# Patient Record
Sex: FEMALE | URBAN - METROPOLITAN AREA
[De-identification: names, ages, dates, MRNs, and addresses within clinical notes are randomized per-mention and may not be internally consistent; named-entity substitution may affect disease eponyms.]

---

## 2022-10-28 ENCOUNTER — TRANSCRIBE ORDERS (OUTPATIENT)
Dept: ADMINISTRATIVE | Age: 37
End: 2022-10-28

## 2022-10-28 DIAGNOSIS — N63.10 MASSES OF BOTH BREASTS: Primary | ICD-10-CM

## 2022-10-28 DIAGNOSIS — N63.20 MASSES OF BOTH BREASTS: Primary | ICD-10-CM

## 2022-11-17 ENCOUNTER — HOSPITAL ENCOUNTER (OUTPATIENT)
Dept: ULTRASOUND IMAGING | Age: 37
Discharge: HOME | End: 2022-11-17
Payer: COMMERCIAL

## 2022-11-17 ENCOUNTER — HOSPITAL ENCOUNTER (OUTPATIENT)
Dept: MAMMOGRAPHY | Age: 37
Discharge: HOME | End: 2022-11-17
Payer: COMMERCIAL

## 2022-11-17 DIAGNOSIS — N63.20 MASSES OF BOTH BREASTS: ICD-10-CM

## 2022-11-17 DIAGNOSIS — N63.10 MASSES OF BOTH BREASTS: ICD-10-CM

## 2022-11-17 PROCEDURE — 76641 ULTRASOUND BREAST COMPLETE: CPT

## 2022-11-17 PROCEDURE — G0279 TOMOSYNTHESIS, MAMMO: HCPCS

## 2022-12-14 ENCOUNTER — OFFICE VISIT (OUTPATIENT)
Dept: SURGERY | Age: 37
End: 2022-12-14

## 2022-12-14 VITALS
HEART RATE: 89 BPM | DIASTOLIC BLOOD PRESSURE: 78 MMHG | OXYGEN SATURATION: 97 % | SYSTOLIC BLOOD PRESSURE: 146 MMHG | HEIGHT: 68 IN | WEIGHT: 122 LBS | BODY MASS INDEX: 18.49 KG/M2

## 2022-12-14 DIAGNOSIS — N63.10 MASS OF RIGHT BREAST, UNSPECIFIED QUADRANT: Primary | ICD-10-CM

## 2022-12-14 RX ORDER — CHOLECALCIFEROL (VITAMIN D3) 125 MCG
500 CAPSULE ORAL DAILY
COMMUNITY

## 2022-12-14 ASSESSMENT — ENCOUNTER SYMPTOMS
ABDOMINAL PAIN: 0
NAUSEA: 0
VOMITING: 0
SHORTNESS OF BREATH: 0
COLOR CHANGE: 0
WHEEZING: 0

## 2022-12-14 NOTE — PROGRESS NOTES
General Surgery Progress Note  MD Bernard Shepherd, BENNIE      PATIENT: Maria De Jesus Ridley, 1985, 44 y.o., female  MRN: [id number]    Reason for Evaluation & Chief Complaint:     Chief Complaint   Patient presents with    Consultation     N/P Bilat Breast Mass seen on imaging       SUBJECTIVE:  HPI: Patient presents for evaluation of a breast abnormality found on routine physical exam.  Change was noted 2 months ago. Patient does not routinely do self breast exams. Breast cancer risk factors include nulliparous and delayed child bearing, maternal grandfather with prostate & maternal grandmother with uterine cancer. Patient denies hormonal therapy. Patient denies nipple discharge or retraction. Patient denies to previous breast biopsy. Patient denies a personal history of breast cancer. Mammogram/US: 11/18/22    1. Bilateral well-circumscribed hypoechoic masses in both breasts overall   having a similar sonographic appearance. Findings favor a benign etiology   such as fibroadenomas or complicated cysts. Recommend a short-term    follow-up   ultrasound in 6 months. Dominant lesion in the right breast is amenable    to   percutaneous biopsy or aspiration if clinically indicated or if the    patient   wishes. BIRADS:   BIRADS - CATEGORY 3       I have reviewed the patient's(pertinent information to this visit) medical history, family history(scanned in  the Media tab under \"patient questioner\"), social history and review of systems with the patienttoday in the office. Past Surgical History:   Procedure Laterality Date    WISDOM TOOTH EXTRACTION Bilateral      History reviewed. No pertinent past medical history.   Family History   Problem Relation Age of Onset    High Blood Pressure Father     Heart Disease Maternal Grandfather     Cancer Maternal Grandfather         Prostate    High Blood Pressure Maternal Grandfather     High Blood Pressure Paternal Grandmother     High Blood Pressure Paternal Grandfather     Cancer Maternal Great Grandmother         Uterine    Breast Cancer Neg Hx        Review of Systems:         Review of Systems   Constitutional:  Negative for chills and fever. Respiratory:  Negative for shortness of breath and wheezing. Cardiovascular:  Negative for chest pain. Gastrointestinal:  Negative for abdominal pain, nausea and vomiting. Skin:  Negative for color change. Neurological:  Negative for syncope. OBJECTIVE:  Physical Exam:    BP (!) 146/78 (Site: Right Upper Arm, Position: Sitting, Cuff Size: Medium Adult)   Pulse 89   Ht 5' 7.5\" (1.715 m)   Wt 122 lb (55.3 kg)   SpO2 97%   BMI 18.83 kg/m²      Physical Exam  Constitutional:       Appearance: She is well-developed. Eyes:      General: No scleral icterus. Conjunctiva/sclera: Conjunctivae normal.   Cardiovascular:      Rate and Rhythm: Normal rate and regular rhythm. Heart sounds: Normal heart sounds. No murmur heard. Pulmonary:      Effort: Pulmonary effort is normal. No respiratory distress. Breath sounds: Normal breath sounds. No wheezing. Chest:   Breasts:     Right: Mass present. No inverted nipple, nipple discharge or tenderness. Left: Mass present. No inverted nipple, nipple discharge or tenderness. ASSESSMENT:  1. Mass of right breast, unspecified quadrant      PLAN:  Neri Iraheta is a 44 y.o. woman who presents today for a consultation regarding her bilateral abnormal mammogram.  I reviewed the clinical and imaging findings with her today. I explained to Ms. Fan Shagufta that imaging is helpful in identifying abnormal growths in the breast, but that histological evaluation of the tissue is the only way to know if a finding is cancerous or not. Possible pathologic etiologies include fibrocystic change, papilloma, atypia and carcinoma. Based on the core biopsy results, she may or may not require additional intervention.  I explained the rationale for an ultrasound-guided core needle biopsy     I explained that the pathology results should be available within a few business days of the procedure. If this confirms a benign finding, I would recommend that she resume her yearly screening mammograms. If there are any atypical or malignant features, we will discuss further recommendations in detail. At this time, she has no further questions and agrees to move forward with scheduling the biopsy. I encouraged her to call with any additional questions or concerns that may arise in the meantime. Follow Up:  Return for Follow up after testing.       Kimani Cordova, YVON - CNP

## 2023-01-05 ENCOUNTER — HOSPITAL ENCOUNTER (OUTPATIENT)
Dept: MAMMOGRAPHY | Age: 38
Discharge: HOME | End: 2023-01-05
Payer: COMMERCIAL

## 2023-01-05 ENCOUNTER — HOSPITAL ENCOUNTER (OUTPATIENT)
Dept: ULTRASOUND IMAGING | Age: 38
Discharge: HOME | End: 2023-01-05
Payer: COMMERCIAL

## 2023-01-05 DIAGNOSIS — N63.41 SUBAREOLAR MASS OF RIGHT BREAST: ICD-10-CM

## 2023-01-05 DIAGNOSIS — N63.10 MASS OF RIGHT BREAST, UNSPECIFIED QUADRANT: ICD-10-CM

## 2023-01-05 PROCEDURE — 2709999900 US BREAST BIOPSY W LOC DEVICE 1ST LESION RIGHT

## 2023-01-05 PROCEDURE — 88342 IMHCHEM/IMCYTCHM 1ST ANTB: CPT

## 2023-01-05 PROCEDURE — 88305 TISSUE EXAM BY PATHOLOGIST: CPT

## 2023-01-05 PROCEDURE — 77065 DX MAMMO INCL CAD UNI: CPT

## 2023-01-05 PROCEDURE — 88341 IMHCHEM/IMCYTCHM EA ADD ANTB: CPT

## 2023-01-09 ENCOUNTER — INITIAL CONSULT (OUTPATIENT)
Dept: ONCOLOGY | Age: 38
End: 2023-01-09
Payer: COMMERCIAL

## 2023-01-09 ENCOUNTER — HOSPITAL ENCOUNTER (OUTPATIENT)
Dept: INFUSION THERAPY | Age: 38
Discharge: HOME | End: 2023-01-09
Payer: COMMERCIAL

## 2023-01-09 VITALS
WEIGHT: 120.8 LBS | TEMPERATURE: 98 F | DIASTOLIC BLOOD PRESSURE: 106 MMHG | BODY MASS INDEX: 18.31 KG/M2 | SYSTOLIC BLOOD PRESSURE: 173 MMHG | OXYGEN SATURATION: 99 % | HEIGHT: 68 IN | HEART RATE: 60 BPM

## 2023-01-09 DIAGNOSIS — D75.1 ERYTHROCYTOSIS: ICD-10-CM

## 2023-01-09 LAB
ALBUMIN SERPL-MCNC: 4.6 GM/DL (ref 3.4–5)
ALP BLD-CCNC: 52 IU/L (ref 40–129)
ALT SERPL-CCNC: 10 U/L (ref 10–40)
ANION GAP SERPL CALCULATED.3IONS-SCNC: 13 MMOL/L (ref 4–16)
AST SERPL-CCNC: 13 IU/L (ref 15–37)
BASOPHILS ABSOLUTE: 0 K/CU MM
BASOPHILS RELATIVE PERCENT: 0.4 % (ref 0–1)
BILIRUB SERPL-MCNC: 1.6 MG/DL (ref 0–1)
BUN BLDV-MCNC: 13 MG/DL (ref 6–23)
CALCIUM SERPL-MCNC: 9.4 MG/DL (ref 8.3–10.6)
CHLORIDE BLD-SCNC: 105 MMOL/L (ref 99–110)
CO2: 21 MMOL/L (ref 21–32)
CREAT SERPL-MCNC: 0.6 MG/DL (ref 0.6–1.1)
DIFFERENTIAL TYPE: ABNORMAL
EOSINOPHILS ABSOLUTE: 0 K/CU MM
EOSINOPHILS RELATIVE PERCENT: 0.5 % (ref 0–3)
ERYTHROCYTE SEDIMENTATION RATE: <1 MM/HR (ref 0–20)
GFR SERPL CREATININE-BSD FRML MDRD: >60 ML/MIN/1.73M2
GLUCOSE BLD-MCNC: 93 MG/DL (ref 70–99)
HCT VFR BLD CALC: 45.4 % (ref 37–47)
HEMOGLOBIN: 15.1 GM/DL (ref 12.5–16)
LACTATE DEHYDROGENASE: 140 IU/L (ref 120–246)
LYMPHOCYTES ABSOLUTE: 1.6 K/CU MM
LYMPHOCYTES RELATIVE PERCENT: 19 % (ref 24–44)
MCH RBC QN AUTO: 29.5 PG (ref 27–31)
MCHC RBC AUTO-ENTMCNC: 33.3 % (ref 32–36)
MCV RBC AUTO: 88.7 FL (ref 78–100)
MONOCYTES ABSOLUTE: 0.4 K/CU MM
MONOCYTES RELATIVE PERCENT: 5.2 % (ref 0–4)
PDW BLD-RTO: 13.2 % (ref 11.7–14.9)
PLATELET # BLD: 218 K/CU MM (ref 140–440)
PMV BLD AUTO: 10.5 FL (ref 7.5–11.1)
POTASSIUM SERPL-SCNC: 4.3 MMOL/L (ref 3.5–5.1)
RBC # BLD: 5.12 M/CU MM (ref 4.2–5.4)
SEGMENTED NEUTROPHILS ABSOLUTE COUNT: 6.3 K/CU MM
SEGMENTED NEUTROPHILS RELATIVE PERCENT: 74.9 % (ref 36–66)
SODIUM BLD-SCNC: 139 MMOL/L (ref 135–145)
TOTAL PROTEIN: 7.1 GM/DL (ref 6.4–8.2)
WBC # BLD: 8.4 K/CU MM (ref 4–10.5)

## 2023-01-09 PROCEDURE — 99211 OFF/OP EST MAY X REQ PHY/QHP: CPT

## 2023-01-09 PROCEDURE — 85652 RBC SED RATE AUTOMATED: CPT

## 2023-01-09 PROCEDURE — 85025 COMPLETE CBC W/AUTO DIFF WBC: CPT

## 2023-01-09 PROCEDURE — 1036F TOBACCO NON-USER: CPT | Performed by: INTERNAL MEDICINE

## 2023-01-09 PROCEDURE — 82668 ASSAY OF ERYTHROPOIETIN: CPT

## 2023-01-09 PROCEDURE — G8427 DOCREV CUR MEDS BY ELIG CLIN: HCPCS | Performed by: INTERNAL MEDICINE

## 2023-01-09 PROCEDURE — 99204 OFFICE O/P NEW MOD 45 MIN: CPT | Performed by: INTERNAL MEDICINE

## 2023-01-09 PROCEDURE — 36415 COLL VENOUS BLD VENIPUNCTURE: CPT

## 2023-01-09 PROCEDURE — G8420 CALC BMI NORM PARAMETERS: HCPCS | Performed by: INTERNAL MEDICINE

## 2023-01-09 PROCEDURE — 83615 LACTATE (LD) (LDH) ENZYME: CPT

## 2023-01-09 PROCEDURE — G8484 FLU IMMUNIZE NO ADMIN: HCPCS | Performed by: INTERNAL MEDICINE

## 2023-01-09 PROCEDURE — 80053 COMPREHEN METABOLIC PANEL: CPT

## 2023-01-09 ASSESSMENT — PATIENT HEALTH QUESTIONNAIRE - PHQ9
SUM OF ALL RESPONSES TO PHQ QUESTIONS 1-9: 0
2. FEELING DOWN, DEPRESSED OR HOPELESS: 0
SUM OF ALL RESPONSES TO PHQ QUESTIONS 1-9: 0
SUM OF ALL RESPONSES TO PHQ9 QUESTIONS 1 & 2: 0
1. LITTLE INTEREST OR PLEASURE IN DOING THINGS: 0

## 2023-01-09 NOTE — PROGRESS NOTES
Patient Name:  Ayad Asher  Patient :  1985  Patient MRN:  [id number]     Primary Oncologist: Andrzej Garcia MD  Referring Provider: ANGY Dickson     Date of Service: 2023      Reason for Consult: To evaluate the patient with erythrocytosis. Chief Complaint:    Chief Complaint   Patient presents with    New Patient     Patient Active Problem List:     Erythrocytosis    HPI:   Ayad Asher is a 76-year-old very pleasant female with medical history significant for migraine headache and seasonal allergy, referred to me on 2023 for evaluation of erythrocytosis. She was noted to have mild erythrocytosis (Hemoglobin 16.6, hematocrit 48.5, RBC 5.49) on 10/14/22 labs. Repeat blood tests on 10/20/22, 10/26/22 and 22 showed persistently elevated mild erythrocytosis. She doesn't have leukocytosis or thrombocytosis. Differential showed relative neutrophilia. She denies smoking, sleep apnea or high altitude living. One of her paternal aunt has similar issue and work ups for her erythrocytosis were normal according to patient. Since she is found to have persistent erythrocytosis, she was referred to me for further evaluation. She doesn't have any significant symptoms at today visit. Past Medical History:     Significant for  1. Migraine headache  2. Seasonal allergy                                                    Past Surgery History:    Significant for  1. Breast biopsy on 2023                                                                           Social History:   She denies smoking, alcohol drinking or illicit drug abuse. Family History:    Significant for hypertension and mini stroke in her father, prostate cancer and hypertension in her maternal grandfather. Her paternal grandfather has diabetes and dementia. One of her paternal aunt has erythrocytosis. No Known Allergies    Current Outpatient Medications on File Prior to Visit   Medication Sig Dispense Refill    Cholecalciferol (VITAMIN D3) 125 MCG (5000 UT) TABS Take by mouth      vitamin B-12 (CYANOCOBALAMIN) 500 MCG tablet Take 1,000 mcg by mouth daily       No current facility-administered medications on file prior to visit. Review of Systems:  Constitutional:  No weight loss, No fever, No chills, No night sweats. Energy level good. Eyes:  No diplopia, No transient or permanent loss of vision, No scotomata. ENT / Mouth:  No epistaxis, No dysphagia, No hoarseness, No oral ulcers, No gingival bleeding. No sore throat, No postnasal drip, No nasal drip, No mouth pain, No sinus pain, No tinnitus, Normal hearing. Cardiovascular:  No chest pain, No palpitations, No syncope, No upper extremity edema, No lower extremity edema, No calf discomfort. Respiratory:  No cough. No hemoptysis, No pleurisy, No wheezing, No dyspnea. Breast:  No breast mass, No pain, No nipple discharge, No change in size, No change in shape. Gastrointestinal:  No abdominal pain, No abdominal cramping, No nausea, No vomiting, No constipation, No diarrhea, No hematochezia, No melena, No jaundice, No dyspepsia, No dysphagia. Urinary:  No dysuria, No hematuria, No urinary incontinence. Gynecological:  No vaginal discharge, No suprapubic pain, No abnormal vaginal bleeding. Musculoskeletal:  No muscle pain, No swollen joints, No joint redness, No bone pain, No spine tenderness. Skin:  No rash, No nodules, No pruritus, No lesions. Neurologic:  No confusion, No seizures, No syncope, No tremor, No speech change, No headache, No hiccups, No abnormal gait, No sensory changes, No weakness. Psychiatric:  No depression, No anxiety, Concentration normal.  Endocrine:  No polyuria, No polydipsia, No hot flashes, No thyroid symptoms.   Hematologic:  No epistaxis, No gingival bleeding, No petechiae, No ecchymosis. Lymphatic:  No lymphadenopathy, No lymphedema. Allergy / Immunologic:  No eczema, No frequent mucous infections, No frequent respiratory infections, No recurrent urticarial, No frequent skin infections. Vital Signs: BP (!) 173/106 (Site: Right Upper Arm, Position: Sitting, Cuff Size: Medium Adult)   Pulse 60   Temp 98 °F (36.7 °C) (Infrared)   Ht 5' 7.5\" (1.715 m)   Wt 120 lb 12.8 oz (54.8 kg)   SpO2 99%   BMI 18.64 kg/m²      Physical Exam:  CONSTITUTIONAL: awake, alert, cooperative, no apparent distress   EYES: pupils equal, round and reactive to light, sclera clear, normal conjunctiva  ENT: Normocephalic, without obvious abnormality, atraumatic  NECK: supple, symmetrical, no jugular venous distension, no carotid bruits   HEMATOLOGIC/LYMPHATIC: no cervical, supraclavicular or axillary lymphadenopathy   LUNGS: VBS, no wheezes, no increased work of breathing, no rhonchi, clear to auscultation, no crackles,    CARDIOVASCULAR: regular rate and rhythm, normal S1 and S2, no murmur noted  ABDOMEN: normal bowel sounds x 4, soft, non-distended, non-tender, no masses palpated, no hepatosplenomegaly   MUSCULOSKELETAL: full range of motion noted, tone is normal  NEUROLOGIC: awake, alert, oriented to name, place and time. Motor skills grossly intact. SKIN: appears intact, normal skin color, normal texture, normal turgor, no jaundice.    EXTREMITIES: no LE edema, no leg swelling, no cyanosis, no clubbing,       Labs:  Hematology:  Lab Results   Component Value Date    WBC 8.4 01/09/2023    RBC 5.12 01/09/2023    HGB 15.1 01/09/2023    HCT 45.4 01/09/2023    MCV 88.7 01/09/2023    MCH 29.5 01/09/2023    MCHC 33.3 01/09/2023    RDW 13.2 01/09/2023     01/09/2023    MPV 10.5 01/09/2023    SEGSPCT 74.9 (H) 01/09/2023    EOSRELPCT 0.5 01/09/2023    BASOPCT 0.4 01/09/2023    LYMPHOPCT 19.0 (L) 01/09/2023    MONOPCT 5.2 (H) 01/09/2023    SEGSABS 6.3 01/09/2023    EOSABS 0.0 01/09/2023    BASOSABS 0.0 01/09/2023    LYMPHSABS 1.6 01/09/2023    MONOSABS 0.4 01/09/2023    DIFFTYPE AUTOMATED DIFFERENTIAL 01/09/2023     No results found for: ESR  Chemistry:  No results found for: NA, K, CL, CO2, BUN, CREATININE, GLUCOSE, CALCIUM, PROT, LABALBU, BILITOT, ALKPHOS, AST, ALT, LABGLOM, GFRAA, AGRATIO, GLOB, PHOS, MG, POCCA, POCGLU  No results found for: MMA, LDH, HOMOCYSTEINE  No components found for: LD  No results found for: TSHHS, T4FREE, FT3  Immunology:  No results found for: PROT, SPEP, ALBUMINELP, LABALPH, LABBETA, GAMGLOB  No results found for: Vernon Kaitlin, KLFLCR  No results found for: B2M  Coagulation Panel:  No results found for: PROTIME, INR, APTT, DDIMER  Anemia Panel:  No results found for: [id number], FOLATE  Tumor Markers:  No results found for: , CEA, , LABCA2, PSA     Observations:  PHQ-9 Total Score: 0 (1/9/2023  1:50 PM)    Assessment   Erythrocytosis    Plan:  Anne Castillo is a 34-year-old very pleasant female who was noted to have mild erythrocytosis (Hemoglobin 16.6, hematocrit 48.5, RBC 5.49) on 10/14/22 labs. Repeat blood tests on 10/20/22, 10/26/22 and 12/2/22 showed persistently elevated mild erythrocytosis. She doesn't have leukocytosis or thrombocytosis. Differential showed relative neutrophilia. She denies smoking, sleep apnea or high altitude living. One of her paternal aunt has similar issue and work ups for her erythrocytosis were normal according to patient. I recommend to send proper work ups today to rule out primary polycythemia. I will request erythropoietin level, ESR, LDH, BCR-ABL1, JAK2 V617F, JAK2 exon 12-13, MPL and CALR today. I will also request US abdomen to r/o erythropoietin producing tumor. If all the above tests are normal, I will recommend for close observation. If she is found to have primary polycythemia, I will recommend therapeutic phlebotomy to keep her hematocrit less than 45%. I answered all her questions and concerns for today. Thank you for allowing me to participate in the care of this very pleasant patient. Recent imaging and labs were reviewed and discussed with the patient.

## 2023-01-09 NOTE — PROGRESS NOTES
MA Rooming Questions  Patient: Gisella Freed  MRN: [id number]    Date: 1/9/2023     New patient       Did the patient have a depression screening completed today?  Yes    No data recorded     PHQ-9 Given to (if applicable):               PHQ-9 Score (if applicable):                     [] Positive     [x]  Negative              Does question #9 need addressed (if applicable)                     [] Yes    [x]  No               Grant Ogden MA

## 2023-01-11 ENCOUNTER — HOSPITAL ENCOUNTER (OUTPATIENT)
Dept: ULTRASOUND IMAGING | Age: 38
Discharge: HOME | End: 2023-01-11
Payer: COMMERCIAL

## 2023-01-11 DIAGNOSIS — D75.1 ERYTHROCYTOSIS: ICD-10-CM

## 2023-01-11 LAB — ERYTHROPOIETIN: 10 MU/ML (ref 4–27)

## 2023-01-11 PROCEDURE — 76705 ECHO EXAM OF ABDOMEN: CPT

## 2023-01-15 ASSESSMENT — ENCOUNTER SYMPTOMS
WHEEZING: 0
VOMITING: 0
ABDOMINAL PAIN: 0
SHORTNESS OF BREATH: 0
COLOR CHANGE: 0
NAUSEA: 0

## 2023-01-15 NOTE — PROGRESS NOTES
General Surgery Progress Note  MD Stoney Martínez, BENNIE      PATIENT: Kay Bermudez, 1985, 44 y.o., female  MRN: [id number]    Reason for Evaluation & Chief Complaint:     Chief Complaint   Patient presents with    Follow-up     Fu US Guided Needle BX results 1/5/2023         SUBJECTIVE:  HPI: Patient presents for evaluation of a breast abnormality found on routine physical exam.  Change was noted 2 months ago. Patient does not routinely do self breast exams. Breast cancer risk factors include nulliparous and delayed child bearing, maternal grandfather with prostate & maternal grandmother with uterine cancer. Patient denies hormonal therapy. Patient denies nipple discharge or retraction. Patient denies to previous breast biopsy. Patient denies a personal history of breast cancer. She presents for follow up after biopsy    Mammogram/US: 11/18/22    1. Bilateral well-circumscribed hypoechoic masses in both breasts overall   having a similar sonographic appearance. Findings favor a benign etiology   such as fibroadenomas or complicated cysts. Recommend a short-term    follow-up   ultrasound in 6 months. Dominant lesion in the right breast is amenable    to   percutaneous biopsy or aspiration if clinically indicated or if the    patient   wishes. BIRADS:   BIRADS - CATEGORY 3     Breast, right, retroareolar, ultrasound guided needle core   biopsy:   -  Benign breast tissue containing fibroadenomatoid change,   focal cyst formation, usual   ductal hyperplasia, and rare focal pseudoangiomatous stroma   hyperplasia (see stains report). -  Negative for malignancy. I have reviewed the patient's(pertinent information to this visit) medical history, family history(scanned in  the Media tab under \"patient questioner\"), social history and review of systems with the patienttoday in the office.           Past Surgical History:   Procedure Laterality Date    US BREAST NEEDLE BIOPSY RIGHT Right 1/5/2023    US BREAST NEEDLE BIOPSY RIGHT 1/5/2023 [facility] [place] ULTRASOUND    WISDOM TOOTH EXTRACTION Bilateral      History reviewed. No pertinent past medical history. Family History   Problem Relation Age of Onset    High Blood Pressure Father     Heart Disease Maternal Grandfather     Cancer Maternal Grandfather         Prostate    High Blood Pressure Maternal Grandfather     High Blood Pressure Paternal Grandmother     High Blood Pressure Paternal Grandfather     Cancer Maternal Great Grandmother         Uterine    Breast Cancer Neg Hx        Review of Systems:         Review of Systems   Constitutional:  Negative for chills and fever. Respiratory:  Negative for shortness of breath and wheezing. Cardiovascular:  Negative for chest pain. Gastrointestinal:  Negative for abdominal pain, nausea and vomiting. Skin:  Negative for color change. Neurological:  Negative for syncope. OBJECTIVE:  Physical Exam:    /68 (Site: Right Upper Arm, Position: Sitting, Cuff Size: Medium Adult)   Pulse (!) 105   Ht 5' 7.5\" (1.715 m)   Wt 120 lb (54.4 kg)   SpO2 99%   BMI 18.52 kg/m²      Physical Exam  Constitutional:       Appearance: She is well-developed. Eyes:      General: No scleral icterus. Conjunctiva/sclera: Conjunctivae normal.   Cardiovascular:      Rate and Rhythm: Normal rate and regular rhythm. Heart sounds: Normal heart sounds. No murmur heard. Pulmonary:      Effort: Pulmonary effort is normal. No respiratory distress. Breath sounds: Normal breath sounds. No wheezing. Chest:   Breasts:     Right: Mass present. No inverted nipple, nipple discharge or tenderness. Left: Mass present. No inverted nipple, nipple discharge or tenderness. ASSESSMENT:  1. Mass of right breast, unspecified quadrant        PLAN:  Padilla Sim is a 44 y.o. woman who presents today to discuss biopsy results; results benign. Would recommend repeat mammogram in 6 months.       Follow Up:  Return in about 6 months (around 7/16/2023).       Nate Schaefer, APRN - CNP

## 2023-01-16 ENCOUNTER — OFFICE VISIT (OUTPATIENT)
Dept: SURGERY | Age: 38
End: 2023-01-16
Payer: COMMERCIAL

## 2023-01-16 VITALS
HEIGHT: 68 IN | DIASTOLIC BLOOD PRESSURE: 68 MMHG | HEART RATE: 105 BPM | SYSTOLIC BLOOD PRESSURE: 122 MMHG | WEIGHT: 120 LBS | OXYGEN SATURATION: 99 % | BODY MASS INDEX: 18.19 KG/M2

## 2023-01-16 DIAGNOSIS — N63.10 MASS OF RIGHT BREAST, UNSPECIFIED QUADRANT: Primary | ICD-10-CM

## 2023-01-16 PROCEDURE — G8420 CALC BMI NORM PARAMETERS: HCPCS | Performed by: NURSE PRACTITIONER

## 2023-01-16 PROCEDURE — 99213 OFFICE O/P EST LOW 20 MIN: CPT | Performed by: NURSE PRACTITIONER

## 2023-01-16 PROCEDURE — G8427 DOCREV CUR MEDS BY ELIG CLIN: HCPCS | Performed by: NURSE PRACTITIONER

## 2023-01-16 PROCEDURE — G8484 FLU IMMUNIZE NO ADMIN: HCPCS | Performed by: NURSE PRACTITIONER

## 2023-01-16 PROCEDURE — 1036F TOBACCO NON-USER: CPT | Performed by: NURSE PRACTITIONER

## 2023-01-16 ASSESSMENT — PATIENT HEALTH QUESTIONNAIRE - PHQ9
1. LITTLE INTEREST OR PLEASURE IN DOING THINGS: 0
SUM OF ALL RESPONSES TO PHQ QUESTIONS 1-9: 0
2. FEELING DOWN, DEPRESSED OR HOPELESS: 0
SUM OF ALL RESPONSES TO PHQ QUESTIONS 1-9: 0
SUM OF ALL RESPONSES TO PHQ9 QUESTIONS 1 & 2: 0

## 2023-01-21 LAB
BCR/ABL T(9,22): NORMAL
CALR MUTATION: NORMAL
JAK2 EXONS 12-15 MUTATION: NORMAL
JAK2 GENE MUTATION QUAL: NORMAL
MPL 515 MUTATION: NORMAL

## 2023-01-21 NOTE — PROGRESS NOTES
Patient Name:  Pravin Skelton  Patient :  1985  Patient MRN:  [id number]     Primary Oncologist: Varghese Montalvo MD  Referring Provider: ANGY Medellin     Date of Service: 2023     Chief Complaint:    Chief Complaint   Patient presents with    Follow-up     Patient Active Problem List:     Erythrocytosis    HPI:   Pravin Skelton is a 65-year-old very pleasant female with medical history significant for migraine headache and seasonal allergy, referred to me on 2023 for evaluation of erythrocytosis. She was noted to have mild erythrocytosis (Hemoglobin 16.6, hematocrit 48.5, RBC 5.49) on 10/14/22 labs. Repeat blood tests on 10/20/22, 10/26/22 and 22 showed persistently elevated mild erythrocytosis. She doesn't have leukocytosis or thrombocytosis. Differential showed relative neutrophilia. She denies smoking, sleep apnea or high altitude living. One of her paternal aunt has similar issue and work ups for her erythrocytosis were normal according to patient. Since she is found to have persistent erythrocytosis, she was referred to me for further evaluation. Laboratory work ups done on 23 showed normal hemoglobin and hematocrit (15.1/45.4). Her erythropoietin level is also normal (10 mU/ml). She has mild elevation in serum bilirubin. The rests of the blood tests, including LDH, ESR, BCR-ABL1, JAK2 V617F, JAK2 exon 12-14, MPL and CALR were within normal range. US abdomen done on 23 showed echogenic lesions in the liver measuring up to 1.6 cm. Findings may represent hemangioma, MRI liver may be obtained for confirmation. On 2023, she presented to me for follow-up. She was initially referred to me for evaluation of mild erythrocytosis. On today's visit, I reviewed with her findings on labs done on 2023 and ultrasound abdomen done on 2023.     Since her hemoglobin/hematocrit is back to normal and other work-ups for primary erythrocytosis negative, I recommend for observation. At this moment, I do not think she has primary polycythemia vera. Since she is found to have indeterminate 1.6 cm liver lesion, I will request MRI of the liver to make sure that it is not malignant process. She was seen by gastroenterologist for elevated liver enzymes. She is going to have some laboratory work ups soon and I will follow up with the results. She doesn't have any significant symptoms at today visit. Past Medical History:     Significant for  1. Migraine headache  2. Seasonal allergy                                                    Past Surgery History:    Significant for  1. Breast biopsy on 1/5/2023                                                                           Social History:   She denies smoking, alcohol drinking or illicit drug abuse. Family History:    Significant for hypertension and mini stroke in her father, prostate cancer and hypertension in her maternal grandfather. Her paternal grandfather has diabetes and dementia. One of her paternal aunt has erythrocytosis. No Known Allergies    Review of Systems:  Constitutional:  No weight loss, No fever, No chills, No night sweats. Energy level is pretty good. Eyes:  No diplopia, No transient or permanent loss of vision, No scotomata. ENT / Mouth:  No epistaxis, No dysphagia, No hoarseness, No oral ulcers, No gingival bleeding. No sore throat, No postnasal drip, No nasal drip, No mouth pain, No sinus pain, No tinnitus, Normal hearing. Cardiovascular:  No chest pain, No palpitations, No syncope, No upper extremity edema, No lower extremity edema, No calf discomfort. Respiratory:  No cough. No hemoptysis, No pleurisy, No wheezing, No dyspnea.   Breast:  No breast mass, No pain, No nipple discharge, No change in size, No change in shape.  Gastrointestinal:  No abdominal pain, No abdominal cramping, No nausea, No vomiting, No constipation, No diarrhea, No hematochezia, No melena, No jaundice, No dyspepsia, No dysphagia.  Urinary:  No dysuria, No hematuria, No urinary incontinence.  Gynecological:  No vaginal discharge, No suprapubic pain, No abnormal vaginal bleeding.    Musculoskeletal:  No muscle pain, No swollen joints, No joint redness, No bone pain, No spine tenderness.  Skin:  No rash, No nodules, No pruritus, No lesions.  Neurologic:  No confusion, No seizures, No syncope, No tremor, No speech change, No headache, No hiccups, No abnormal gait, No sensory changes, No weakness.  Psychiatric:  No depression, No anxiety, Concentration normal.  Endocrine:  No polyuria, No polydipsia, No hot flashes, No thyroid symptoms.  Hematologic:  No epistaxis, No gingival bleeding, No petechiae, No ecchymosis.  Lymphatic:  No lymphadenopathy, No lymphedema.  Allergy / Immunologic:  No eczema, No frequent mucous infections, No frequent respiratory infections, No recurrent urticarial, No frequent skin infections.     Vital Signs: BP (!) 172/94 (Site: Right Upper Arm, Position: Sitting, Cuff Size: Medium Adult)   Pulse (!) 103   Temp 97.9 °F (36.6 °C) (Infrared)   Ht 5' 7.5\" (1.715 m)   Wt 120 lb (54.4 kg)   SpO2 98%   BMI 18.52 kg/m²      Physical Exam:  CONSTITUTIONAL: awake, alert, cooperative, no apparent distress   EYES: pupils equal, round and reactive to light, sclera clear, normal conjunctiva  ENT: Normocephalic, without obvious abnormality, atraumatic  NECK: supple, symmetrical, no jugular venous distension, no carotid bruits   HEMATOLOGIC/LYMPHATIC: no cervical, supraclavicular or axillary lymphadenopathy   LUNGS: VBS, no wheezes, no increased work of breathing, no rhonchi, clear to auscultation, no crackles,    CARDIOVASCULAR: regular rate and rhythm, normal S1 and S2, no murmur noted  ABDOMEN:  normal bowel sounds x 4, soft, non-distended, non-tender, no masses palpated, no hepatosplenomegaly   MUSCULOSKELETAL: full range of motion noted, tone is normal  NEUROLOGIC: awake, alert, oriented to name, place and time. Motor skills grossly intact. SKIN: appears intact, normal skin color, normal texture, normal turgor, no jaundice.    EXTREMITIES: no LE edema, no clubbing, no leg swelling, no cyanosis,       Labs:  Hematology:  Lab Results   Component Value Date    WBC 8.4 01/09/2023    RBC 5.12 01/09/2023    HGB 15.1 01/09/2023    HCT 45.4 01/09/2023    MCV 88.7 01/09/2023    MCH 29.5 01/09/2023    MCHC 33.3 01/09/2023    RDW 13.2 01/09/2023     01/09/2023    MPV 10.5 01/09/2023    SEGSPCT 74.9 (H) 01/09/2023    EOSRELPCT 0.5 01/09/2023    BASOPCT 0.4 01/09/2023    LYMPHOPCT 19.0 (L) 01/09/2023    MONOPCT 5.2 (H) 01/09/2023    SEGSABS 6.3 01/09/2023    EOSABS 0.0 01/09/2023    BASOSABS 0.0 01/09/2023    LYMPHSABS 1.6 01/09/2023    MONOSABS 0.4 01/09/2023    DIFFTYPE AUTOMATED DIFFERENTIAL 01/09/2023     Lab Results   Component Value Date    ESR <1 01/09/2023     Chemistry:  Lab Results   Component Value Date     01/09/2023    K 4.3 01/09/2023     01/09/2023    CO2 21 01/09/2023    BUN 13 01/09/2023    CREATININE 0.6 01/09/2023    GLUCOSE 93 01/09/2023    CALCIUM 9.4 01/09/2023    PROT 7.1 01/09/2023    LABALBU 4.6 01/09/2023    BILITOT 1.6 (H) 01/09/2023    ALKPHOS 52 01/09/2023    AST 13 (L) 01/09/2023    ALT 10 01/09/2023    LABGLOM >60 01/09/2023     Lab Results   Component Value Date     01/09/2023     No components found for: LD  No results found for: TSHHS, T4FREE, FT3  Immunology:  Lab Results   Component Value Date    PROT 7.1 01/09/2023     No results found for: Terbharatia Chela, KLFLCR  No results found for: B2M  Coagulation Panel:  No results found for: PROTIME, INR, APTT, DDIMER  Anemia Panel:  No results found for: [id number], FOLATE  Tumor Markers:  No results found for: , CEA, , LABCA2, PSA     Observations:  No data recorded    Assessment   Erythrocytosis    Plan:  Sheela Palmer is a 42-year-old very pleasant female who was noted to have mild erythrocytosis (Hemoglobin 16.6, hematocrit 48.5, RBC 5.49) on 10/14/22 labs. Repeat blood tests on 10/20/22, 10/26/22 and 12/2/22 showed persistently elevated mild erythrocytosis. She doesn't have leukocytosis or thrombocytosis. Differential showed relative neutrophilia. She denies smoking, sleep apnea or high altitude living. One of her paternal aunt has similar issue and work ups for her erythrocytosis were normal according to patient. Laboratory work ups done on 1/9/23 showed normal hemoglobin and hematocrit (15.1/45.4). Her erythropoietin level is also normal (10 mU/ml). She has mild elevation in serum bilirubin. The rests of the blood tests, including LDH, ESR, BCR-ABL1, JAK2 V617F, JAK2 exon 12-14, MPL and CALR were within normal range. US abdomen done on 1/11/23 showed echogenic lesions in the liver measuring up to 1.6 cm. Findings may represent hemangioma, MRI liver may be obtained for confirmation. On January 30, 2023, she presented to me for follow-up. She was initially referred to me for evaluation of mild erythrocytosis. On today's visit, I reviewed with her findings on labs done on 1/9/2023 and ultrasound abdomen done on 1/11/2023. Since her hemoglobin/hematocrit is back to normal and other work-ups for primary erythrocytosis negative, I recommend for observation. At this moment, I do not think she has primary polycythemia vera. Since she is found to have indeterminate 1.6 cm liver lesion, I will request MRI of the liver to make sure that it is not malignant process. She was seen by gastroenterologist for elevated liver enzymes. She is going to have some laboratory work ups soon and I will follow up with the results. I answered all her questions and concerns for today.  Thank you for allowing me to participate in the care of this very pleasant patient. Recent imaging and labs were reviewed and discussed with the patient.

## 2023-01-30 ENCOUNTER — OFFICE VISIT (OUTPATIENT)
Dept: ONCOLOGY | Age: 38
End: 2023-01-30
Payer: COMMERCIAL

## 2023-01-30 ENCOUNTER — HOSPITAL ENCOUNTER (OUTPATIENT)
Dept: INFUSION THERAPY | Age: 38
Discharge: HOME | End: 2023-01-30
Payer: COMMERCIAL

## 2023-01-30 VITALS
DIASTOLIC BLOOD PRESSURE: 94 MMHG | HEIGHT: 68 IN | HEART RATE: 103 BPM | TEMPERATURE: 97.9 F | WEIGHT: 120 LBS | OXYGEN SATURATION: 98 % | SYSTOLIC BLOOD PRESSURE: 172 MMHG | BODY MASS INDEX: 18.19 KG/M2

## 2023-01-30 DIAGNOSIS — K76.9 LIVER LESION: ICD-10-CM

## 2023-01-30 DIAGNOSIS — D75.1 ERYTHROCYTOSIS: Primary | ICD-10-CM

## 2023-01-30 PROCEDURE — 99213 OFFICE O/P EST LOW 20 MIN: CPT | Performed by: INTERNAL MEDICINE

## 2023-01-30 PROCEDURE — 99211 OFF/OP EST MAY X REQ PHY/QHP: CPT

## 2023-01-30 PROCEDURE — 1036F TOBACCO NON-USER: CPT | Performed by: INTERNAL MEDICINE

## 2023-01-30 PROCEDURE — G8420 CALC BMI NORM PARAMETERS: HCPCS | Performed by: INTERNAL MEDICINE

## 2023-01-30 PROCEDURE — G8484 FLU IMMUNIZE NO ADMIN: HCPCS | Performed by: INTERNAL MEDICINE

## 2023-01-30 PROCEDURE — G8427 DOCREV CUR MEDS BY ELIG CLIN: HCPCS | Performed by: INTERNAL MEDICINE

## 2023-01-30 NOTE — PROGRESS NOTES
MA Rooming Questions  Patient: Mary Ann Oshea  MRN: [id number]    Date: 1/30/2023        1. Do you have any new issues?   no         2. Do you need any refills on medications?    no    3. Have you had any imaging done since your last visit? yes - u/s 1/11    4. Have you been hospitalized or seen in the emergency room since your last visit here?   no    5. Did the patient have a depression screening completed today?  No    No data recorded     PHQ-9 Given to (if applicable):               PHQ-9 Score (if applicable):                     [] Positive     []  Negative              Does question #9 need addressed (if applicable)                     [] Yes    []  No               Paige Raymond CMA

## 2023-01-30 NOTE — LETTER
[address] [place] [address]  [facility] [address]  Phone: [phone]  Fax: [phone]    Ciera Stone MD    January 31, 2023     Kai Lal, [address] [place], [place] [address]y [id number] [id number]    Patient: El Bermudez   MR Number: [id number]   YOB: 1985   Date of Visit: 1/30/2023       Dear Kai Lal: Thank you for referring El Bermudez to me for evaluation/treatment. Below are the relevant portions of my assessment and plan of care. If you have questions, please do not hesitate to call me. I look forward to following Payton Talbot along with you.     Sincerely,      Ciera Stone MD

## 2023-02-08 ENCOUNTER — HOSPITAL ENCOUNTER (OUTPATIENT)
Dept: MRI IMAGING | Age: 38
Discharge: HOME | End: 2023-02-08
Payer: COMMERCIAL

## 2023-02-08 DIAGNOSIS — D75.1 ERYTHROCYTOSIS: ICD-10-CM

## 2023-02-08 DIAGNOSIS — K76.9 LIVER LESION: ICD-10-CM

## 2023-02-08 PROCEDURE — 74183 MRI ABD W/O CNTR FLWD CNTR: CPT

## 2023-02-08 PROCEDURE — 6360000004 HC RX CONTRAST MEDICATION: Performed by: INTERNAL MEDICINE

## 2023-02-08 PROCEDURE — A9579 GAD-BASE MR CONTRAST NOS,1ML: HCPCS | Performed by: INTERNAL MEDICINE

## 2023-02-08 RX ADMIN — GADOTERIDOL 15 ML: 279.3 INJECTION, SOLUTION INTRAVENOUS at 11:49

## 2023-02-14 ENCOUNTER — TELEPHONE (OUTPATIENT)
Dept: ONCOLOGY | Age: 38
End: 2023-02-14

## 2023-02-19 NOTE — PROGRESS NOTES
Patient Name:  Kesha Jasmine  Patient :  1985  Patient MRN:  [id number]     Primary Oncologist: Lewis Mabry MD  Referring Provider: No primary care provider on file.     Date of Service: 2023     Chief Complaint:    Chief Complaint   Patient presents with    Follow-up     Patient Active Problem List:     Erythrocytosis    HPI:   Kesha Jasmine is a 37-year-old very pleasant female with medical history significant for migraine headache and seasonal allergy, referred to me on 2023 for evaluation of erythrocytosis.    She was noted to have mild erythrocytosis (Hemoglobin 16.6, hematocrit 48.5, RBC 5.49) on 10/14/22 labs. Repeat blood tests on 10/20/22, 10/26/22 and 22 showed persistently elevated mild erythrocytosis.     She doesn't have leukocytosis or thrombocytosis. Differential showed relative neutrophilia.     She denies smoking, sleep apnea or high altitude living. One of her paternal aunt has similar issue and work ups for her erythrocytosis were normal according to patient.     Since she is found to have persistent erythrocytosis, she was referred to me for further evaluation.     Laboratory work ups done on 23 showed normal hemoglobin and hematocrit (15.1/45.4). Her erythropoietin level is also normal (10 mU/ml). She has mild elevation in serum bilirubin. The rests of the blood tests, including LDH, ESR, BCR-ABL1, JAK2 V617F, JAK2 exon 12-14, MPL and CALR were within normal range.     US abdomen done on 23 showed echogenic lesions in the liver measuring up to 1.6 cm.  Findings may represent hemangioma, MRI liver may be obtained for confirmation.    MRI abdomen done on 23 showed suspected subtle hyperenhancing lesions in hepatic segment 8 and 5 could be hemangiomas or focal nodular hyperplasias and likely correspond with hyperechoic lesions seen sonographically. Radiologist recommend repeat imaging in 6 months utilizing eovist contrast. Possible iron deposition in the liver and  pancreas that can be seen with primary hemochromatosis. No findings of cirrhosis. On February 21, 2023, she presented to me for follow-up. She was initially referred to me for evaluation of mild erythrocytosis. Since her hemoglobin/hematocrit is back to normal and other work-ups for primary erythrocytosis on 1/9/23 were negative, I recommend for observation. At this moment, I do not think she has primary polycythemia vera. Since she is found to have indeterminate 1.6 cm liver lesion, I requested MRI. It was done on 2/8/23 and it is most likely hemangiomas. I will repeat MRI with eovist contrast in 6 months. For her possible iron deposition in liver and pancreas noted on MRI - will request iron study and hemochromatosis DNA panel. She doesn't have family history of hemochromatosis. She was seen by gastroenterologist for elevated liver enzymes. She is going to have some laboratory work ups soon and I will follow up with the results. She doesn't have any significant symptoms at today visit. Past Medical History:     Significant for  1. Migraine headache  2. Seasonal allergy                                                    Past Surgery History:    Significant for  1. Breast biopsy on 1/5/2023                                                                           Social History:   She denies smoking, alcohol drinking or illicit drug abuse. Family History:    Significant for hypertension and mini stroke in her father, prostate cancer and hypertension in her maternal grandfather. Her paternal grandfather has diabetes and dementia. One of her paternal aunt has erythrocytosis. No Known Allergies    Review of Systems: \"Per interval history; otherwise 10 point ROS is negative. \"   Her energy level is pretty good today and her sleep is fine. She denies fever, chills, night sweats, cough, shortness of breath, chest pain, hemoptysis, or palpitations. Her bowel and bladder functions are normal. She denies nausea, vomiting, abdominal pain, diarrhea, constipation, dysuria, loss of appetite, or weight loss. She denies neuropathy and she doesn't have bleeding or clotting issues. She denies any pain in her body. Denies anxiety or depression.  The rest of the systems are unremarkable.      Vital Signs: BP (!) 166/84 (Site: Right Upper Arm, Position: Sitting, Cuff Size: Medium Adult)   Pulse (!) 121   Temp 98 °F (36.7 °C) (Infrared)   Ht 5' 7.5\" (1.715 m)   Wt 122 lb (55.3 kg)   SpO2 99%   BMI 18.83 kg/m²      Physical Exam:  CONSTITUTIONAL: awake, alert, cooperative, no apparent distress   EYES: pupils equal, round and reactive to light, sclera clear, normal conjunctiva  ENT: Normocephalic, without obvious abnormality, atraumatic  NECK: supple, symmetrical, no jugular venous distension, no carotid bruits   HEMATOLOGIC/LYMPHATIC: no cervical, supraclavicular or axillary lymphadenopathy   LUNGS: VBS, no wheezes, clear to auscultation, no crackles, no increased work of breathing, no rhonchi,    CARDIOVASCULAR: regular rate and rhythm, normal S1 and S2, no murmur noted  ABDOMEN: normal bowel sounds x 4, soft, non-distended, non-tender, no masses palpated, no hepatosplenomegaly   MUSCULOSKELETAL: full range of motion noted, tone is normal  NEUROLOGIC: awake, alert, oriented to name, place and time. Motor skills grossly intact.   SKIN: appears intact, normal skin color, normal texture, normal turgor, no jaundice.   EXTREMITIES: no clubbing, no leg swelling, no LE edema, no cyanosis,       Labs:  Hematology:  Lab Results   Component Value Date    WBC 8.4 01/09/2023    RBC 5.12 01/09/2023    HGB 15.1 01/09/2023    HCT 45.4 01/09/2023    MCV 88.7 01/09/2023    MCH 29.5 01/09/2023    MCHC 33.3 01/09/2023    RDW 13.2  01/09/2023     01/09/2023    MPV 10.5 01/09/2023    SEGSPCT 74.9 (H) 01/09/2023    EOSRELPCT 0.5 01/09/2023    BASOPCT 0.4 01/09/2023    LYMPHOPCT 19.0 (L) 01/09/2023    MONOPCT 5.2 (H) 01/09/2023    SEGSABS 6.3 01/09/2023    EOSABS 0.0 01/09/2023    BASOSABS 0.0 01/09/2023    LYMPHSABS 1.6 01/09/2023    MONOSABS 0.4 01/09/2023    DIFFTYPE AUTOMATED DIFFERENTIAL 01/09/2023     Lab Results   Component Value Date    ESR <1 01/09/2023     Chemistry:  Lab Results   Component Value Date     01/09/2023    K 4.3 01/09/2023     01/09/2023    CO2 21 01/09/2023    BUN 13 01/09/2023    CREATININE 0.6 01/09/2023    GLUCOSE 93 01/09/2023    CALCIUM 9.4 01/09/2023    PROT 7.1 01/09/2023    LABALBU 4.6 01/09/2023    BILITOT 1.6 (H) 01/09/2023    ALKPHOS 52 01/09/2023    AST 13 (L) 01/09/2023    ALT 10 01/09/2023    LABGLOM >60 01/09/2023     Lab Results   Component Value Date     01/09/2023     No components found for: LD  No results found for: TSHHS, T4FREE, FT3  Immunology:  Lab Results   Component Value Date    PROT 7.1 01/09/2023     No results found for: Lion Trammell, KLFLCR  No results found for: B2M  Coagulation Panel:  No results found for: PROTIME, INR, APTT, DDIMER  Anemia Panel:  No results found for: [id number], FOLATE  Tumor Markers:  No results found for: , CEA, , LABCA2, PSA     Observations:  No data recorded    Assessment   Erythrocytosis    Plan:  Gordo Ramos is a 34-year-old very pleasant female who was noted to have mild erythrocytosis (Hemoglobin 16.6, hematocrit 48.5, RBC 5.49) on 10/14/22 labs. Repeat blood tests on 10/20/22, 10/26/22 and 12/2/22 showed persistently elevated mild erythrocytosis. She doesn't have leukocytosis or thrombocytosis. Differential showed relative neutrophilia. She denies smoking, sleep apnea or high altitude living. One of her paternal aunt has similar issue and work ups for her erythrocytosis were normal according to patient. Laboratory work ups done on 1/9/23 showed normal hemoglobin and hematocrit (15.1/45.4). Her erythropoietin level is also normal (10 mU/ml). She has mild elevation in serum bilirubin. The rests of the blood tests, including LDH, ESR, BCR-ABL1, JAK2 V617F, JAK2 exon 12-14, MPL and CALR were within normal range. US abdomen done on 1/11/23 showed echogenic lesions in the liver measuring up to 1.6 cm. Findings may represent hemangioma, MRI liver may be obtained for confirmation. MRI abdomen done on 2/8/23 showed suspected subtle hyperenhancing lesions in hepatic segment 8 and 5 could be hemangiomas or focal nodular hyperplasias and likely correspond with hyperechoic lesions seen sonographically. Radiologist recommend repeat imaging in 6 months utilizing eovist contrast. Possible iron deposition in the liver and pancreas that can be seen with primary hemochromatosis. No findings of cirrhosis. On February 21, 2023, she presented to me for follow-up. She was initially referred to me for evaluation of mild erythrocytosis. Since her hemoglobin/hematocrit is back to normal and other work-ups for primary erythrocytosis on 1/9/23 were negative, I recommend for observation. At this moment, I do not think she has primary polycythemia vera. Since she is found to have indeterminate 1.6 cm liver lesion, I requested MRI. It was done on 2/8/23 and it is most likely hemangiomas. I will repeat MRI with eovist contrast in 6 months. For her possible iron deposition in liver and pancreas noted on MRI - will request iron study and hemochromatosis DNA panel. She doesn't have family history of hemochromatosis. She was seen by gastroenterologist for elevated liver enzymes. She is going to have some laboratory work ups soon and I will follow up with the results. I answered all her questions and concerns for today. Recent imaging and labs were reviewed and discussed with the patient.

## 2023-02-21 ENCOUNTER — HOSPITAL ENCOUNTER (OUTPATIENT)
Dept: INFUSION THERAPY | Age: 38
Discharge: HOME | End: 2023-02-21
Payer: COMMERCIAL

## 2023-02-21 ENCOUNTER — OFFICE VISIT (OUTPATIENT)
Dept: ONCOLOGY | Age: 38
End: 2023-02-21
Payer: COMMERCIAL

## 2023-02-21 VITALS
HEART RATE: 121 BPM | TEMPERATURE: 98 F | DIASTOLIC BLOOD PRESSURE: 84 MMHG | BODY MASS INDEX: 18.49 KG/M2 | OXYGEN SATURATION: 99 % | SYSTOLIC BLOOD PRESSURE: 166 MMHG | WEIGHT: 122 LBS | HEIGHT: 68 IN

## 2023-02-21 DIAGNOSIS — R93.5 ABNORMAL MRI OF ABDOMEN: ICD-10-CM

## 2023-02-21 DIAGNOSIS — D75.1 ERYTHROCYTOSIS: ICD-10-CM

## 2023-02-21 DIAGNOSIS — D75.1 ERYTHROCYTOSIS: Primary | ICD-10-CM

## 2023-02-21 LAB
FERRITIN: 25 NG/ML (ref 15–150)
IRON: 40 UG/DL (ref 37–145)
PCT TRANSFERRIN: 13 % (ref 10–44)
TOTAL IRON BINDING CAPACITY: 316 UG/DL (ref 250–450)
UNSATURATED IRON BINDING CAPACITY: 276 UG/DL (ref 110–370)

## 2023-02-21 PROCEDURE — 83540 ASSAY OF IRON: CPT

## 2023-02-21 PROCEDURE — 36415 COLL VENOUS BLD VENIPUNCTURE: CPT

## 2023-02-21 PROCEDURE — 1036F TOBACCO NON-USER: CPT | Performed by: INTERNAL MEDICINE

## 2023-02-21 PROCEDURE — G8484 FLU IMMUNIZE NO ADMIN: HCPCS | Performed by: INTERNAL MEDICINE

## 2023-02-21 PROCEDURE — 82728 ASSAY OF FERRITIN: CPT

## 2023-02-21 PROCEDURE — 99213 OFFICE O/P EST LOW 20 MIN: CPT | Performed by: INTERNAL MEDICINE

## 2023-02-21 PROCEDURE — 99211 OFF/OP EST MAY X REQ PHY/QHP: CPT

## 2023-02-21 PROCEDURE — G8420 CALC BMI NORM PARAMETERS: HCPCS | Performed by: INTERNAL MEDICINE

## 2023-02-21 PROCEDURE — 81256 HFE GENE: CPT

## 2023-02-21 PROCEDURE — G8427 DOCREV CUR MEDS BY ELIG CLIN: HCPCS | Performed by: INTERNAL MEDICINE

## 2023-02-21 PROCEDURE — 83550 IRON BINDING TEST: CPT

## 2023-02-21 NOTE — PROGRESS NOTES
MA Rooming Questions  Patient: Timoteo Hanna  MRN: [id number]    Date: 2/21/2023        1. Do you have any new issues?   no         2. Do you need any refills on medications?    no    3. Have you had any imaging done since your last visit? yes - MRI 2/8    4. Have you been hospitalized or seen in the emergency room since your last visit here?   no    5. Did the patient have a depression screening completed today?  No    No data recorded     PHQ-9 Given to (if applicable):               PHQ-9 Score (if applicable):                     [] Positive     []  Negative              Does question #9 need addressed (if applicable)                     [] Yes    []  No               Cherry Velasquez CMA

## 2023-03-01 LAB
HFE GENE MUT ANL BLD/T: NORMAL
HFE P.C282Y BLD/T QL: NEGATIVE
HFE P.H63D BLD/T QL: NORMAL
HFE P.S65C BLD/T QL: NEGATIVE
SPECIMEN SOURCE: NORMAL

## 2023-03-07 ENCOUNTER — OFFICE VISIT (OUTPATIENT)
Dept: ONCOLOGY | Age: 38
End: 2023-03-07
Payer: COMMERCIAL

## 2023-03-07 ENCOUNTER — HOSPITAL ENCOUNTER (OUTPATIENT)
Dept: INFUSION THERAPY | Age: 38
Discharge: HOME | End: 2023-03-07
Payer: COMMERCIAL

## 2023-03-07 VITALS
TEMPERATURE: 98.2 F | RESPIRATION RATE: 16 BRPM | DIASTOLIC BLOOD PRESSURE: 79 MMHG | WEIGHT: 122.2 LBS | OXYGEN SATURATION: 99 % | SYSTOLIC BLOOD PRESSURE: 155 MMHG | HEIGHT: 67 IN | BODY MASS INDEX: 19.18 KG/M2 | HEART RATE: 85 BPM

## 2023-03-07 DIAGNOSIS — K76.9 LIVER LESION: ICD-10-CM

## 2023-03-07 DIAGNOSIS — D75.1 ERYTHROCYTOSIS: Primary | ICD-10-CM

## 2023-03-07 PROCEDURE — 99211 OFF/OP EST MAY X REQ PHY/QHP: CPT

## 2023-03-07 PROCEDURE — G8420 CALC BMI NORM PARAMETERS: HCPCS | Performed by: INTERNAL MEDICINE

## 2023-03-07 PROCEDURE — G8427 DOCREV CUR MEDS BY ELIG CLIN: HCPCS | Performed by: INTERNAL MEDICINE

## 2023-03-07 PROCEDURE — 1036F TOBACCO NON-USER: CPT | Performed by: INTERNAL MEDICINE

## 2023-03-07 PROCEDURE — G8484 FLU IMMUNIZE NO ADMIN: HCPCS | Performed by: INTERNAL MEDICINE

## 2023-03-07 PROCEDURE — 99213 OFFICE O/P EST LOW 20 MIN: CPT | Performed by: INTERNAL MEDICINE

## 2023-03-07 NOTE — PROGRESS NOTES
MA Rooming Questions  Patient: Milla Rader  MRN: [id number]    Date: 3/7/2023        1. Do you have any new issues? 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 Do you need any refills on medications?    no    3. Have you had any imaging done since your last visit?   no    4. Have you been hospitalized or seen in the emergency room since your last visit here?   no    5. Did the patient have a depression screening completed today?  No    No data recorded     PHQ-9 Given to (if applicable):               PHQ-9 Score (if applicable):                     [] Positive     []  Negative              Does question #9 need addressed (if applicable)                     [] Yes    []  No               Darleen Velez CMA

## 2023-03-07 NOTE — PROGRESS NOTES
Patient Name:  Hermilo Jarrell  Patient :  1985  Patient MRN:  [id number]     Primary Oncologist: Benny Christopher MD  Referring Provider: No primary care provider on file. Date of Service: 3/7/2023     Chief Complaint:    Chief Complaint   Patient presents with    Follow-up       Patient Active Problem List:     Erythrocytosis    HPI:   Hermilo Jarrell is a 39-year-old very pleasant female with medical history significant for migraine headache and seasonal allergy, referred to me on 2023 for evaluation of erythrocytosis. She was noted to have mild erythrocytosis (Hemoglobin 16.6, hematocrit 48.5, RBC 5.49) on 10/14/22 labs. Repeat blood tests on 10/20/22, 10/26/22 and 22 showed persistently elevated mild erythrocytosis. She doesn't have leukocytosis or thrombocytosis. Differential showed relative neutrophilia. She denies smoking, sleep apnea or high altitude living. One of her paternal aunt has similar issue and work ups for her erythrocytosis were normal according to patient. Since she is found to have persistent erythrocytosis, she was referred to me for further evaluation. Laboratory work ups done on 23 showed normal hemoglobin and hematocrit (15.1/45.4). Her erythropoietin level is also normal (10 mU/ml). She has mild elevation in serum bilirubin. The rests of the blood tests, including LDH, ESR, BCR-ABL1, JAK2 V617F, JAK2 exon 12-14, MPL and CALR were within normal range. US abdomen done on 23 showed echogenic lesions in the liver measuring up to 1.6 cm. Findings may represent hemangioma, MRI liver may be obtained for confirmation. MRI abdomen done on 23 showed suspected subtle hyperenhancing lesions in hepatic segment 8 and 5 could be hemangiomas or focal nodular hyperplasias and likely correspond with hyperechoic lesions seen sonographically.  Radiologist recommend repeat imaging in 6 months utilizing eovist contrast. Possible iron deposition in the liver and pancreas that can be seen with primary hemochromatosis. No findings of cirrhosis. Iron study done on 2/21/23 showed ferritin 25 ng/ml, iron 40, TIBC 316, transferrin 13%. Hereditary hemochromatosis DNA panel showed heterozygous H63D mutation. On March 7, 2023, she presented to me for follow-up. She was initially referred to me for evaluation of mild erythrocytosis. Since her hemoglobin/hematocrit is back to normal and other work-ups for primary erythrocytosis on 1/9/23 were negative, I recommend for observation. At this moment, I do not think she has primary polycythemia vera. Since she is found to have indeterminate 1.6 cm liver lesion, I requested MRI. It was done on 2/8/23 and it is most likely hemangiomas. I will request MRI with eovist contrast in 6 months. For her possible iron deposition in liver and pancreas noted on MRI - however iron study was normal and hemochromatosis DNA panel only showed heterozygous H63D mutation. I don't think she has iron deposition disease. No additional work ups needed. She was seen by gastroenterologist for elevated bilirubin and she was diagnosed with Gilbert's syndrome. She doesn't have any significant symptoms at today visit. Past Medical History:     Significant for  1. Migraine headache  2. Seasonal allergy                                                    Past Surgery History:    Significant for  1. Breast biopsy on 1/5/2023                                                                           Social History:   She denies smoking, alcohol drinking or illicit drug abuse. Family History:    Significant for hypertension and mini stroke in her father, prostate cancer and hypertension in her maternal grandfather. Her paternal grandfather has diabetes and dementia. One of her paternal aunt has erythrocytosis. No Known Allergies    Review of Systems: \"Per interval history; otherwise 10 point ROS is negative. \"   Her energy level is good and her sleep is fine. She denies fever, chills, night sweats, cough, shortness of breath, chest pain, hemoptysis, or palpitations. Her bowel and bladder functions are normal. She denies nausea, vomiting, abdominal pain, diarrhea, constipation, dysuria, loss of appetite, or weight loss. She denies neuropathy and she doesn't have bleeding or clotting issues. She denies any pain in her body. No anxiety or depression. The rest of the systems are unremarkable. Vital Signs: BP (!) 155/79 (Site: Right Upper Arm, Position: Sitting, Cuff Size: Medium Adult)   Pulse 85   Temp 98.2 °F (36.8 °C) (Temporal)   Resp 16   Ht 5' 7\" (1.702 m)   Wt 122 lb 3.2 oz (55.4 kg)   SpO2 99%   BMI 19.14 kg/m²      Physical Exam:  CONSTITUTIONAL: awake, alert, cooperative, no apparent distress   EYES: pupils equal, round and reactive to light, sclera clear, normal conjunctiva  ENT: Normocephalic, without obvious abnormality, atraumatic  NECK: supple, symmetrical, no jugular venous distension, no carotid bruits   HEMATOLOGIC/LYMPHATIC: no cervical, supraclavicular or axillary lymphadenopathy   LUNGS: VBS, no wheezes, clear to auscultation, no crackles, no increased work of breathing, no rhonchi,    CARDIOVASCULAR: regular rate and rhythm, normal S1 and S2, no murmur noted  ABDOMEN: normal bowel sounds x 4, soft, non-distended, non-tender, no masses palpated, no hepatosplenomegaly   MUSCULOSKELETAL: full range of motion noted, tone is normal  NEUROLOGIC: awake, alert, oriented to name, place and time. Motor skills grossly intact. SKIN: appears intact, normal skin color, normal texture, normal turgor, no jaundice.    EXTREMITIES: no leg swelling, no LE edema, no clubbing, no cyanosis,       Labs:  Hematology:  Lab Results   Component Value Date    WBC 8.4 01/09/2023    RBC 5.12 01/09/2023    HGB 15.1 01/09/2023    HCT 45.4 01/09/2023    MCV 88.7 01/09/2023    MCH 29.5 01/09/2023    MCHC 33.3 01/09/2023    RDW 13.2 01/09/2023     01/09/2023    MPV 10.5 01/09/2023    SEGSPCT 74.9 (H) 01/09/2023    EOSRELPCT 0.5 01/09/2023    BASOPCT 0.4 01/09/2023    LYMPHOPCT 19.0 (L) 01/09/2023    MONOPCT 5.2 (H) 01/09/2023    SEGSABS 6.3 01/09/2023    EOSABS 0.0 01/09/2023    BASOSABS 0.0 01/09/2023    LYMPHSABS 1.6 01/09/2023    MONOSABS 0.4 01/09/2023    DIFFTYPE AUTOMATED DIFFERENTIAL 01/09/2023     Lab Results   Component Value Date    ESR <1 01/09/2023     Chemistry:  Lab Results   Component Value Date     01/09/2023    K 4.3 01/09/2023     01/09/2023    CO2 21 01/09/2023    BUN 13 01/09/2023    CREATININE 0.6 01/09/2023    GLUCOSE 93 01/09/2023    CALCIUM 9.4 01/09/2023    PROT 7.1 01/09/2023    LABALBU 4.6 01/09/2023    BILITOT 1.6 (H) 01/09/2023    ALKPHOS 52 01/09/2023    AST 13 (L) 01/09/2023    ALT 10 01/09/2023    LABGLOM >60 01/09/2023     Lab Results   Component Value Date     01/09/2023     No components found for: LD  No results found for: TSHHS, T4FREE, FT3  Immunology:  Lab Results   Component Value Date    PROT 7.1 01/09/2023     No results found for: Creed Lolis, KLFLCR  No results found for: B2M  Coagulation Panel:  No results found for: PROTIME, INR, APTT, DDIMER  Anemia Panel:  No results found for: BETRKGNC24, FOLATE  Tumor Markers:  No results found for: , CEA, , LABCA2, PSA     Observations:  No data recorded    Assessment   Erythrocytosis    Plan:  Dominga Fuentes is a 79-year-old very pleasant female who was noted to have mild erythrocytosis (Hemoglobin 16.6, hematocrit 48.5, RBC 5.49) on 10/14/22 labs. Repeat blood tests on 10/20/22, 10/26/22 and 12/2/22 showed persistently elevated mild erythrocytosis. She doesn't have leukocytosis or thrombocytosis.  Differential showed relative neutrophilia. She denies smoking, sleep apnea or high altitude living. One of her paternal aunt has similar issue and work ups for her erythrocytosis were normal according to patient. Laboratory work ups done on 1/9/23 showed normal hemoglobin and hematocrit (15.1/45.4). Her erythropoietin level is also normal (10 mU/ml). She has mild elevation in serum bilirubin. The rests of the blood tests, including LDH, ESR, BCR-ABL1, JAK2 V617F, JAK2 exon 12-14, MPL and CALR were within normal range. US abdomen done on 1/11/23 showed echogenic lesions in the liver measuring up to 1.6 cm. Findings may represent hemangioma, MRI liver may be obtained for confirmation. MRI abdomen done on 2/8/23 showed suspected subtle hyperenhancing lesions in hepatic segment 8 and 5 could be hemangiomas or focal nodular hyperplasias and likely correspond with hyperechoic lesions seen sonographically. Radiologist recommend repeat imaging in 6 months utilizing eovist contrast. Possible iron deposition in the liver and pancreas that can be seen with primary hemochromatosis. No findings of cirrhosis. Iron study done on 2/21/23 showed ferritin 25 ng/ml, iron 40, TIBC 316, transferrin 13%. Hereditary hemochromatosis DNA panel showed heterozygous H63D mutation. On March 7, 2023, she presented to me for follow-up. She was initially referred to me for evaluation of mild erythrocytosis. Since her hemoglobin/hematocrit is back to normal and other work-ups for primary erythrocytosis on 1/9/23 were negative, I recommend for observation. At this moment, I do not think she has primary polycythemia vera. Since she is found to have indeterminate 1.6 cm liver lesion, I requested MRI. It was done on 2/8/23 and it is most likely hemangiomas. I will request MRI with eovist contrast in 6 months.      For her possible iron deposition in liver and pancreas noted on MRI - however iron study was normal and hemochromatosis DNA panel only showed heterozygous H63D mutation. I don't think she has iron deposition disease. No additional work ups needed. She was seen by gastroenterologist for elevated bilirubin and she was diagnosed with Gilbert's syndrome. I answered all her questions and concerns for today. Recent imaging and labs were reviewed and discussed with the patient.

## 2023-05-04 ENCOUNTER — HOSPITAL ENCOUNTER (OUTPATIENT)
Dept: MAMMOGRAPHY | Age: 38
End: 2023-05-04
Payer: COMMERCIAL

## 2023-05-04 ENCOUNTER — HOSPITAL ENCOUNTER (OUTPATIENT)
Dept: ULTRASOUND IMAGING | Age: 38
Discharge: HOME | End: 2023-05-04
Payer: COMMERCIAL

## 2023-05-04 DIAGNOSIS — R92.8 ABNORMAL FINDING ON BREAST IMAGING: ICD-10-CM

## 2023-05-04 DIAGNOSIS — N63.10 MASS OF RIGHT BREAST, UNSPECIFIED QUADRANT: ICD-10-CM

## 2023-05-04 PROCEDURE — 76642 ULTRASOUND BREAST LIMITED: CPT

## 2023-05-15 ENCOUNTER — OFFICE VISIT (OUTPATIENT)
Dept: SURGERY | Age: 38
End: 2023-05-15
Payer: COMMERCIAL

## 2023-05-15 VITALS
HEART RATE: 89 BPM | HEIGHT: 67 IN | SYSTOLIC BLOOD PRESSURE: 140 MMHG | DIASTOLIC BLOOD PRESSURE: 92 MMHG | BODY MASS INDEX: 19.15 KG/M2 | OXYGEN SATURATION: 97 % | WEIGHT: 122 LBS

## 2023-05-15 DIAGNOSIS — N60.12 FIBROCYSTIC BREAST CHANGES OF BOTH BREASTS: ICD-10-CM

## 2023-05-15 DIAGNOSIS — N63.10 MASS OF RIGHT BREAST, UNSPECIFIED QUADRANT: Primary | ICD-10-CM

## 2023-05-15 DIAGNOSIS — N60.11 FIBROCYSTIC BREAST CHANGES OF BOTH BREASTS: ICD-10-CM

## 2023-05-15 PROCEDURE — G8427 DOCREV CUR MEDS BY ELIG CLIN: HCPCS | Performed by: SURGERY

## 2023-05-15 PROCEDURE — G8420 CALC BMI NORM PARAMETERS: HCPCS | Performed by: SURGERY

## 2023-05-15 PROCEDURE — 1036F TOBACCO NON-USER: CPT | Performed by: SURGERY

## 2023-05-15 PROCEDURE — 99213 OFFICE O/P EST LOW 20 MIN: CPT | Performed by: SURGERY

## 2023-05-15 ASSESSMENT — ENCOUNTER SYMPTOMS
SHORTNESS OF BREATH: 0
NAUSEA: 0
WHEEZING: 0
VOMITING: 0
ABDOMINAL PAIN: 0
COLOR CHANGE: 0

## 2023-05-15 ASSESSMENT — PATIENT HEALTH QUESTIONNAIRE - PHQ9
SUM OF ALL RESPONSES TO PHQ QUESTIONS 1-9: 0
1. LITTLE INTEREST OR PLEASURE IN DOING THINGS: 0
2. FEELING DOWN, DEPRESSED OR HOPELESS: 0
SUM OF ALL RESPONSES TO PHQ9 QUESTIONS 1 & 2: 0
SUM OF ALL RESPONSES TO PHQ QUESTIONS 1-9: 0

## 2023-08-25 ENCOUNTER — CLINICAL DOCUMENTATION (OUTPATIENT)
Dept: ONCOLOGY | Age: 38
End: 2023-08-25

## 2023-08-30 ENCOUNTER — TELEPHONE (OUTPATIENT)
Dept: ONCOLOGY | Age: 38
End: 2023-08-30

## 2023-09-01 ENCOUNTER — HOSPITAL ENCOUNTER (OUTPATIENT)
Age: 38
Discharge: HOME | End: 2023-09-01
Payer: COMMERCIAL

## 2023-09-01 LAB
ALBUMIN SERPL-MCNC: 4.3 GM/DL (ref 3.4–5)
ALP BLD-CCNC: 50 IU/L (ref 40–129)
ALT SERPL-CCNC: 11 U/L (ref 10–40)
ANION GAP SERPL CALCULATED.3IONS-SCNC: 10 MMOL/L (ref 4–16)
AST SERPL-CCNC: 14 IU/L (ref 15–37)
BASOPHILS ABSOLUTE: 0 K/CU MM
BASOPHILS RELATIVE PERCENT: 0.8 % (ref 0–1)
BILIRUB SERPL-MCNC: 1.8 MG/DL (ref 0–1)
BUN SERPL-MCNC: 9 MG/DL (ref 6–23)
CALCIUM SERPL-MCNC: 9.2 MG/DL (ref 8.3–10.6)
CHLORIDE BLD-SCNC: 105 MMOL/L (ref 99–110)
CO2: 25 MMOL/L (ref 21–32)
CREAT SERPL-MCNC: 0.7 MG/DL (ref 0.6–1.1)
DIFFERENTIAL TYPE: ABNORMAL
EOSINOPHILS ABSOLUTE: 0.2 K/CU MM
EOSINOPHILS RELATIVE PERCENT: 2.9 % (ref 0–3)
ERYTHROCYTE SEDIMENTATION RATE: 1 MM/HR (ref 0–20)
FERRITIN: 20 NG/ML (ref 15–150)
GFR SERPL CREATININE-BSD FRML MDRD: >60 ML/MIN/1.73M2
GLUCOSE P FAST SERPL-MCNC: 92 MG/DL (ref 70–99)
HCT VFR BLD CALC: 45 % (ref 37–47)
HEMOGLOBIN: 14.9 GM/DL (ref 12.5–16)
IMMATURE NEUTROPHIL %: 0.2 % (ref 0–0.43)
IRON: 108 UG/DL (ref 37–145)
LACTATE DEHYDROGENASE: 173 IU/L (ref 120–246)
LYMPHOCYTES ABSOLUTE: 1.9 K/CU MM
LYMPHOCYTES RELATIVE PERCENT: 36.2 % (ref 24–44)
MCH RBC QN AUTO: 29 PG (ref 27–31)
MCHC RBC AUTO-ENTMCNC: 33.1 % (ref 32–36)
MCV RBC AUTO: 87.7 FL (ref 78–100)
MONOCYTES ABSOLUTE: 0.3 K/CU MM
MONOCYTES RELATIVE PERCENT: 6.6 % (ref 0–4)
PCT TRANSFERRIN: 36 % (ref 10–44)
PDW BLD-RTO: 12.7 % (ref 11.7–14.9)
PLATELET # BLD: 214 K/CU MM (ref 140–440)
PMV BLD AUTO: 10.9 FL (ref 7.5–11.1)
POTASSIUM SERPL-SCNC: 4.2 MMOL/L (ref 3.5–5.1)
RBC # BLD: 5.13 M/CU MM (ref 4.2–5.4)
SEGMENTED NEUTROPHILS ABSOLUTE COUNT: 2.7 K/CU MM
SEGMENTED NEUTROPHILS RELATIVE PERCENT: 53.3 % (ref 36–66)
SODIUM BLD-SCNC: 140 MMOL/L (ref 135–145)
TOTAL IMMATURE NEUTOROPHIL: 0.01 K/CU MM
TOTAL IRON BINDING CAPACITY: 302 UG/DL (ref 250–450)
TOTAL PROTEIN: 6.5 GM/DL (ref 6.4–8.2)
UNSATURATED IRON BINDING CAPACITY: 194 UG/DL (ref 110–370)
WBC # BLD: 5.1 K/CU MM (ref 4–10.5)

## 2023-09-01 PROCEDURE — 83550 IRON BINDING TEST: CPT

## 2023-09-01 PROCEDURE — 82728 ASSAY OF FERRITIN: CPT

## 2023-09-01 PROCEDURE — 83540 ASSAY OF IRON: CPT

## 2023-09-01 PROCEDURE — 85045 AUTOMATED RETICULOCYTE COUNT: CPT

## 2023-09-01 PROCEDURE — 80053 COMPREHEN METABOLIC PANEL: CPT

## 2023-09-01 PROCEDURE — 85025 COMPLETE CBC W/AUTO DIFF WBC: CPT

## 2023-09-01 PROCEDURE — 85652 RBC SED RATE AUTOMATED: CPT

## 2023-09-01 PROCEDURE — 83615 LACTATE (LD) (LDH) ENZYME: CPT

## 2023-09-01 PROCEDURE — 36415 COLL VENOUS BLD VENIPUNCTURE: CPT

## 2023-09-05 ENCOUNTER — TELEPHONE (OUTPATIENT)
Dept: ONCOLOGY | Age: 38
End: 2023-09-05

## 2023-09-06 ENCOUNTER — HOSPITAL ENCOUNTER (OUTPATIENT)
Age: 38
Setting detail: SPECIMEN
Discharge: HOME | End: 2023-09-06
Payer: COMMERCIAL

## 2023-09-06 LAB — RETICULOCYTE COUNT PCT: 1.6 % (ref 0.2–2.2)

## 2023-09-06 PROCEDURE — 36415 COLL VENOUS BLD VENIPUNCTURE: CPT

## 2023-09-06 PROCEDURE — 85045 AUTOMATED RETICULOCYTE COUNT: CPT

## 2023-09-16 ENCOUNTER — HOSPITAL ENCOUNTER (OUTPATIENT)
Dept: MRI IMAGING | Age: 38
Discharge: HOME | End: 2023-09-16
Attending: INTERNAL MEDICINE
Payer: COMMERCIAL

## 2023-09-16 DIAGNOSIS — D75.1 ERYTHROCYTOSIS: ICD-10-CM

## 2023-09-16 DIAGNOSIS — K76.9 LIVER LESION: ICD-10-CM

## 2023-09-16 PROCEDURE — 6360000004 HC RX CONTRAST MEDICATION: Performed by: INTERNAL MEDICINE

## 2023-09-16 PROCEDURE — 74183 MRI ABD W/O CNTR FLWD CNTR: CPT

## 2023-09-16 PROCEDURE — A9579 GAD-BASE MR CONTRAST NOS,1ML: HCPCS | Performed by: INTERNAL MEDICINE

## 2023-09-16 RX ADMIN — GADOTERIDOL 12 ML: 279.3 INJECTION, SOLUTION INTRAVENOUS at 09:00

## 2023-09-21 ENCOUNTER — OFFICE VISIT (OUTPATIENT)
Dept: ONCOLOGY | Age: 38
End: 2023-09-21
Payer: COMMERCIAL

## 2023-09-21 ENCOUNTER — HOSPITAL ENCOUNTER (OUTPATIENT)
Dept: INFUSION THERAPY | Age: 38
Discharge: HOME | End: 2023-09-21
Payer: COMMERCIAL

## 2023-09-21 VITALS
OXYGEN SATURATION: 99 % | WEIGHT: 117.8 LBS | TEMPERATURE: 98 F | SYSTOLIC BLOOD PRESSURE: 159 MMHG | DIASTOLIC BLOOD PRESSURE: 83 MMHG | HEIGHT: 67 IN | BODY MASS INDEX: 18.49 KG/M2 | HEART RATE: 84 BPM | RESPIRATION RATE: 14 BRPM

## 2023-09-21 DIAGNOSIS — D75.1 ERYTHROCYTOSIS: Primary | ICD-10-CM

## 2023-09-21 DIAGNOSIS — R93.5 ABNORMAL MRI OF ABDOMEN: ICD-10-CM

## 2023-09-21 PROCEDURE — 99211 OFF/OP EST MAY X REQ PHY/QHP: CPT

## 2023-09-21 PROCEDURE — G8419 CALC BMI OUT NRM PARAM NOF/U: HCPCS | Performed by: INTERNAL MEDICINE

## 2023-09-21 PROCEDURE — 1036F TOBACCO NON-USER: CPT | Performed by: INTERNAL MEDICINE

## 2023-09-21 PROCEDURE — 99213 OFFICE O/P EST LOW 20 MIN: CPT | Performed by: INTERNAL MEDICINE

## 2023-09-21 PROCEDURE — G8427 DOCREV CUR MEDS BY ELIG CLIN: HCPCS | Performed by: INTERNAL MEDICINE

## 2023-09-21 NOTE — PROGRESS NOTES
MA Rooming Questions  Patient: Mainor Keller  MRN: [id number]    Date: 9/21/2023        1. Do you have any new issues?   no         2. Do you need any refills on medications?    no    3. Have you had any imaging done since your last visit? yes - MRI 9/16    4. Have you been hospitalized or seen in the emergency room since your last visit here?   no    5. Did the patient have a depression screening completed today?  No    No data recorded     PHQ-9 Given to (if applicable):               PHQ-9 Score (if applicable):                     [] Positive     []  Negative              Does question #9 need addressed (if applicable)                     [] Yes    []  No               Sheila Cuadra MA

## 2023-09-21 NOTE — PROGRESS NOTES
Patient Name:  Fatou Fabian  Patient :  1985  Patient MRN:  [id number]     Primary Oncologist: Peyton Manzano MD  Referring Provider: No primary care provider on file. Date of Service: 2023     Chief Complaint:    Chief Complaint   Patient presents with    Follow-up     Patient Active Problem List:     Erythrocytosis    HPI:   Fatou Fabian is a 23-year-old very pleasant female with medical history significant for migraine headache and seasonal allergy, referred to me on 2023 for evaluation of erythrocytosis. She was noted to have mild erythrocytosis (Hemoglobin 16.6, hematocrit 48.5, RBC 5.49) on 10/14/22 labs. Repeat blood tests on 10/20/22, 10/26/22 and 22 showed persistently elevated mild erythrocytosis. She doesn't have leukocytosis or thrombocytosis. Differential showed relative neutrophilia. She denies smoking, sleep apnea or high altitude living. One of her paternal aunt has similar issue and work ups for her erythrocytosis were normal according to patient. Since she is found to have persistent erythrocytosis, she was referred to me for further evaluation. Laboratory work ups done on 23 showed normal hemoglobin and hematocrit (15.1/45.4). Her erythropoietin level is also normal (10 mU/ml). She has mild elevation in serum bilirubin. The rests of the blood tests, including LDH, ESR, BCR-ABL1, JAK2 V617F, JAK2 exon 12-14, MPL and CALR were within normal range. US abdomen done on 23 showed echogenic lesions in the liver measuring up to 1.6 cm. Findings may represent hemangioma, MRI liver may be obtained for confirmation. MRI abdomen done on 23 showed suspected subtle hyperenhancing lesions in hepatic segment 8 and 5 could be hemangiomas or focal nodular hyperplasias and likely correspond with hyperechoic lesions seen sonographically.  Radiologist recommend repeat imaging in 6 months utilizing eovist contrast. Possible iron deposition in the liver and

## 2023-10-20 DIAGNOSIS — N60.12 FIBROCYSTIC BREAST CHANGES OF BOTH BREASTS: ICD-10-CM

## 2023-10-20 DIAGNOSIS — N60.11 FIBROCYSTIC BREAST CHANGES OF BOTH BREASTS: ICD-10-CM

## 2023-10-20 DIAGNOSIS — N63.10 MASS OF RIGHT BREAST, UNSPECIFIED QUADRANT: Primary | ICD-10-CM

## 2023-11-02 ENCOUNTER — HOSPITAL ENCOUNTER (OUTPATIENT)
Dept: ULTRASOUND IMAGING | Age: 38
Discharge: HOME | End: 2023-11-02
Payer: COMMERCIAL

## 2023-11-02 ENCOUNTER — HOSPITAL ENCOUNTER (OUTPATIENT)
Dept: MAMMOGRAPHY | Age: 38
Discharge: HOME | End: 2023-11-02
Payer: COMMERCIAL

## 2023-11-02 DIAGNOSIS — N60.12 FIBROCYSTIC BREAST CHANGES OF BOTH BREASTS: ICD-10-CM

## 2023-11-02 DIAGNOSIS — N60.11 FIBROCYSTIC BREAST CHANGES OF BOTH BREASTS: ICD-10-CM

## 2023-11-02 DIAGNOSIS — N63.10 MASS OF RIGHT BREAST, UNSPECIFIED QUADRANT: ICD-10-CM

## 2023-11-02 PROCEDURE — 76641 ULTRASOUND BREAST COMPLETE: CPT

## 2023-11-02 PROCEDURE — G0279 TOMOSYNTHESIS, MAMMO: HCPCS

## 2023-12-04 ENCOUNTER — OFFICE VISIT (OUTPATIENT)
Dept: SURGERY | Age: 38
End: 2023-12-04
Payer: COMMERCIAL

## 2023-12-04 VITALS — OXYGEN SATURATION: 99 % | WEIGHT: 117.5 LBS | HEART RATE: 65 BPM | HEIGHT: 67 IN | BODY MASS INDEX: 18.44 KG/M2

## 2023-12-04 DIAGNOSIS — N60.12 FIBROCYSTIC BREAST CHANGES OF BOTH BREASTS: Primary | ICD-10-CM

## 2023-12-04 DIAGNOSIS — N60.11 FIBROCYSTIC BREAST CHANGES OF BOTH BREASTS: Primary | ICD-10-CM

## 2023-12-04 PROCEDURE — 1036F TOBACCO NON-USER: CPT | Performed by: NURSE PRACTITIONER

## 2023-12-04 PROCEDURE — 99212 OFFICE O/P EST SF 10 MIN: CPT | Performed by: NURSE PRACTITIONER

## 2023-12-04 PROCEDURE — G8419 CALC BMI OUT NRM PARAM NOF/U: HCPCS | Performed by: NURSE PRACTITIONER

## 2023-12-04 PROCEDURE — G8427 DOCREV CUR MEDS BY ELIG CLIN: HCPCS | Performed by: NURSE PRACTITIONER

## 2023-12-04 PROCEDURE — G8484 FLU IMMUNIZE NO ADMIN: HCPCS | Performed by: NURSE PRACTITIONER

## 2023-12-04 RX ORDER — FERROUS SULFATE 325(65) MG
325 TABLET ORAL
COMMUNITY

## 2023-12-04 ASSESSMENT — PATIENT HEALTH QUESTIONNAIRE - PHQ9
1. LITTLE INTEREST OR PLEASURE IN DOING THINGS: 0
SUM OF ALL RESPONSES TO PHQ QUESTIONS 1-9: 0
2. FEELING DOWN, DEPRESSED OR HOPELESS: 0
SUM OF ALL RESPONSES TO PHQ9 QUESTIONS 1 & 2: 0
SUM OF ALL RESPONSES TO PHQ QUESTIONS 1-9: 0

## 2023-12-04 ASSESSMENT — ENCOUNTER SYMPTOMS
ABDOMINAL PAIN: 0
NAUSEA: 0
VOMITING: 0
SHORTNESS OF BREATH: 0
COLOR CHANGE: 0
WHEEZING: 0

## 2023-12-04 NOTE — PROGRESS NOTES
General Surgery Progress Note  MD Angelo Foster, BENNIE      PATIENT: Fatou Fabian, 1985, 43 y.o., female  MRN: [id number]    Reason for Evaluation & Chief Complaint:     Chief Complaint   Patient presents with    Follow-up     Routine FU 11/9/23 Fibro cystic breast      SUBJECTIVE:  HPI: Patient presents for evaluation of a breast abnormality found on routine physical exam.  Change was noted several months ago. Patient does not routinely do self breast exams. Breast cancer risk factors include nulliparous and delayed child bearing, maternal grandfather with prostate & maternal grandmother with uterine cancer. Patient denies hormonal therapy. Patient denies nipple discharge or retraction. Patient denies a personal history of breast cancer. She presents for follow up after Mammogram/US    Mammogram/US: 11/03/23    1. Slight interval decrease in size of at least 2 discrete right breast  masses at the 9 o'clock position and 6 o'clock retroareolar region. These  are considered benign and no further follow-up is warranted. 2. Stable 10 mm lesion in the right breast at the 6 o'clock position 3 cm  from nipple as well as a 6 mm lesion at the 12 o'clock position left breast  dating back to 11/17/2022. Recommend follow-up ultrasound in 6-12 months. 3. No new mammographic evidence for malignancy. BIRADS:  BIRADS - CATEGORY 3     Probably Benign Findings. A short interval follow-up is recommended in 6-12    I have reviewed the patient's(pertinent information to this visit) medical history, family history(scanned in  the Media tab under \"patient questioner\"), social history and review of systems with the patienttoday in the office. Past Surgical History:   Procedure Laterality Date    US BREAST BIOPSY W LOC DEVICE 1ST LESION RIGHT Right 1/5/2023    US BREAST NEEDLE BIOPSY RIGHT 1/5/2023 [facility] ULTRASOUND    WISDOM TOOTH EXTRACTION Bilateral      History reviewed.  No pertinent past

## 2024-03-13 ENCOUNTER — HOSPITAL ENCOUNTER (OUTPATIENT)
Age: 39
Discharge: HOME | End: 2024-03-13
Payer: COMMERCIAL

## 2024-03-13 DIAGNOSIS — R93.5 ABNORMAL MRI OF ABDOMEN: ICD-10-CM

## 2024-03-13 DIAGNOSIS — D75.1 ERYTHROCYTOSIS: ICD-10-CM

## 2024-03-13 LAB
BASOPHILS ABSOLUTE: 0.1 K/CU MM
BASOPHILS RELATIVE PERCENT: 0.8 % (ref 0–1)
DIFFERENTIAL TYPE: ABNORMAL
EOSINOPHILS ABSOLUTE: 0.1 K/CU MM
EOSINOPHILS RELATIVE PERCENT: 1.1 % (ref 0–3)
FERRITIN: 29 NG/ML (ref 15–150)
HCT VFR BLD CALC: 43.6 % (ref 37–47)
HEMOGLOBIN: 14.6 GM/DL (ref 12.5–16)
IMMATURE NEUTROPHIL %: 0.2 % (ref 0–0.43)
IRON: 167 UG/DL (ref 37–145)
LYMPHOCYTES ABSOLUTE: 2.1 K/CU MM
LYMPHOCYTES RELATIVE PERCENT: 33.8 % (ref 24–44)
MCH RBC QN AUTO: 29.4 PG (ref 27–31)
MCHC RBC AUTO-ENTMCNC: 33.5 % (ref 32–36)
MCV RBC AUTO: 87.9 FL (ref 78–100)
MONOCYTES ABSOLUTE: 0.4 K/CU MM
MONOCYTES RELATIVE PERCENT: 5.6 % (ref 0–4)
PCT TRANSFERRIN: 58 % (ref 10–44)
PDW BLD-RTO: 12.4 % (ref 11.7–14.9)
PLATELET # BLD: 216 K/CU MM (ref 140–440)
PMV BLD AUTO: 10.4 FL (ref 7.5–11.1)
RBC # BLD: 4.96 M/CU MM (ref 4.2–5.4)
SEGMENTED NEUTROPHILS ABSOLUTE COUNT: 3.7 K/CU MM
SEGMENTED NEUTROPHILS RELATIVE PERCENT: 58.5 % (ref 36–66)
TOTAL IMMATURE NEUTOROPHIL: 0.01 K/CU MM
TOTAL IRON BINDING CAPACITY: 289 UG/DL (ref 250–450)
UNSATURATED IRON BINDING CAPACITY: 122 UG/DL (ref 110–370)
WBC # BLD: 6.2 K/CU MM (ref 4–10.5)

## 2024-03-13 PROCEDURE — 83550 IRON BINDING TEST: CPT

## 2024-03-13 PROCEDURE — 83540 ASSAY OF IRON: CPT

## 2024-03-13 PROCEDURE — 36415 COLL VENOUS BLD VENIPUNCTURE: CPT

## 2024-03-13 PROCEDURE — 85025 COMPLETE CBC W/AUTO DIFF WBC: CPT

## 2024-03-13 PROCEDURE — 82728 ASSAY OF FERRITIN: CPT

## 2024-03-26 ENCOUNTER — HOSPITAL ENCOUNTER (OUTPATIENT)
Dept: INFUSION THERAPY | Age: 39
Discharge: HOME | End: 2024-03-26
Payer: COMMERCIAL

## 2024-03-26 ENCOUNTER — OFFICE VISIT (OUTPATIENT)
Dept: ONCOLOGY | Age: 39
End: 2024-03-26
Payer: COMMERCIAL

## 2024-03-26 VITALS
WEIGHT: 127.8 LBS | HEART RATE: 84 BPM | HEIGHT: 67 IN | DIASTOLIC BLOOD PRESSURE: 91 MMHG | SYSTOLIC BLOOD PRESSURE: 164 MMHG | BODY MASS INDEX: 20.06 KG/M2 | TEMPERATURE: 97.8 F | RESPIRATION RATE: 16 BRPM | OXYGEN SATURATION: 100 %

## 2024-03-26 DIAGNOSIS — R93.5 ABNORMAL MRI OF ABDOMEN: ICD-10-CM

## 2024-03-26 DIAGNOSIS — K76.9 LIVER LESION: ICD-10-CM

## 2024-03-26 DIAGNOSIS — D75.1 ERYTHROCYTOSIS: Primary | ICD-10-CM

## 2024-03-26 PROCEDURE — G8484 FLU IMMUNIZE NO ADMIN: HCPCS | Performed by: INTERNAL MEDICINE

## 2024-03-26 PROCEDURE — 99211 OFF/OP EST MAY X REQ PHY/QHP: CPT

## 2024-03-26 PROCEDURE — G8427 DOCREV CUR MEDS BY ELIG CLIN: HCPCS | Performed by: INTERNAL MEDICINE

## 2024-03-26 PROCEDURE — G8420 CALC BMI NORM PARAMETERS: HCPCS | Performed by: INTERNAL MEDICINE

## 2024-03-26 PROCEDURE — 99213 OFFICE O/P EST LOW 20 MIN: CPT | Performed by: INTERNAL MEDICINE

## 2024-03-26 PROCEDURE — 1036F TOBACCO NON-USER: CPT | Performed by: INTERNAL MEDICINE

## 2024-03-26 ASSESSMENT — PATIENT HEALTH QUESTIONNAIRE - PHQ9
SUM OF ALL RESPONSES TO PHQ QUESTIONS 1-9: 0
2. FEELING DOWN, DEPRESSED OR HOPELESS: NOT AT ALL
SUM OF ALL RESPONSES TO PHQ QUESTIONS 1-9: 0
1. LITTLE INTEREST OR PLEASURE IN DOING THINGS: NOT AT ALL
SUM OF ALL RESPONSES TO PHQ QUESTIONS 1-9: 0
SUM OF ALL RESPONSES TO PHQ9 QUESTIONS 1 & 2: 0
SUM OF ALL RESPONSES TO PHQ QUESTIONS 1-9: 0

## 2024-03-26 NOTE — PROGRESS NOTES
MA Rooming Questions  Patient: Kesha Jasmine  MRN: [id number]    Date: 3/26/2024        1. Do you have any new issues?   no         2. Do you need any refills on medications?    no    3. Have you had any imaging done since your last visit?   yes - mammo in October @ [facility]    4. Have you been hospitalized or seen in the emergency room since your last visit here?   no    5. Did the patient have a depression screening completed today? Yes    No data recorded     PHQ-9 Given to (if applicable):               PHQ-9 Score (if applicable):                     [] Positive     []  Negative              Does question #9 need addressed (if applicable)                     [] Yes    []  No               Argentina Winkler MA

## 2024-05-02 ENCOUNTER — TELEPHONE (OUTPATIENT)
Dept: CARDIOLOGY CLINIC | Age: 39
End: 2024-05-02

## 2024-05-02 NOTE — TELEPHONE ENCOUNTER
Left vm with patients son to return call to schedule appt with a cardiologist as we have a referral from PCP due to family history of aortic aneurysm.

## 2024-05-20 ENCOUNTER — HOSPITAL ENCOUNTER (OUTPATIENT)
Dept: ULTRASOUND IMAGING | Age: 39
Discharge: HOME | End: 2024-05-20
Payer: COMMERCIAL

## 2024-05-20 DIAGNOSIS — N60.12 FIBROCYSTIC BREAST CHANGES OF BOTH BREASTS: ICD-10-CM

## 2024-05-20 DIAGNOSIS — N60.11 FIBROCYSTIC BREAST CHANGES OF BOTH BREASTS: ICD-10-CM

## 2024-05-20 PROCEDURE — 76642 ULTRASOUND BREAST LIMITED: CPT

## 2024-05-20 NOTE — PROGRESS NOTES
General Surgery Progress Note  MD Yazmin Bowman, BENNIE      PATIENT: Kesha Jasmine, 1985, 38 y.o., female  MRN: [id number]    Reason for Evaluation & Chief Complaint:     Chief Complaint   Patient presents with    Follow-up     US Breast 5/20/2024     SUBJECTIVE:  HPI: Patient presents for follow up evaluation of a breast abnormality found on routine physical exam.  Patient does not routinely do self breast exams. Breast cancer risk factors include nulliparous and delayed child bearing, maternal grandfather with prostate & maternal grandmother with uterine cancer.  Patient denies hormonal therapy. Patient denies nipple discharge or retraction. Patient denies a personal history of breast cancer.    She presents for follow up after Mammogram/US. Denies any changes.     5/20/24:  IMPRESSION:   Stable probably benign findings in each breast; amenable to continued imaging  follow-up. Patient will be due for bilateral diagnostic mammogram and bilateral  targeted ultrasound in 6 months (i.e. November 2024)     Recommendation:     Short interval follow-up, 6 months, bilateral diagnostic mammogram and bilateral  diagnostic ultrasound    From Previously:   Mammogram/US: 11/03/23    1. Slight interval decrease in size of at least 2 discrete right breast  masses at the 9 o'clock position and 6 o'clock retroareolar region.  These  are considered benign and no further follow-up is warranted.  2. Stable 10 mm lesion in the right breast at the 6 o'clock position 3 cm  from nipple as well as a 6 mm lesion at the 12 o'clock position left breast  dating back to 11/17/2022.  Recommend follow-up ultrasound in 6-12 months.  3. No new mammographic evidence for malignancy.     BIRADS:  BIRADS - CATEGORY 3     Probably Benign Findings. A short interval follow-up is recommended in 6-12 months    I have reviewed the patient's(pertinent information to this visit) medical history, family history(scanned in  the

## 2024-05-21 ENCOUNTER — OFFICE VISIT (OUTPATIENT)
Dept: SURGERY | Age: 39
End: 2024-05-21
Payer: COMMERCIAL

## 2024-05-21 VITALS
DIASTOLIC BLOOD PRESSURE: 80 MMHG | WEIGHT: 122.6 LBS | SYSTOLIC BLOOD PRESSURE: 100 MMHG | BODY MASS INDEX: 19.24 KG/M2 | HEART RATE: 94 BPM | HEIGHT: 67 IN | OXYGEN SATURATION: 98 %

## 2024-05-21 DIAGNOSIS — N60.11 FIBROCYSTIC BREAST CHANGES OF BOTH BREASTS: ICD-10-CM

## 2024-05-21 DIAGNOSIS — N60.12 FIBROCYSTIC BREAST CHANGES OF BOTH BREASTS: ICD-10-CM

## 2024-05-21 DIAGNOSIS — N63.10 MASS OF RIGHT BREAST, UNSPECIFIED QUADRANT: Primary | ICD-10-CM

## 2024-05-21 PROCEDURE — 99213 OFFICE O/P EST LOW 20 MIN: CPT | Performed by: SURGERY

## 2024-05-21 PROCEDURE — 1036F TOBACCO NON-USER: CPT | Performed by: SURGERY

## 2024-05-21 PROCEDURE — G8427 DOCREV CUR MEDS BY ELIG CLIN: HCPCS | Performed by: SURGERY

## 2024-05-21 PROCEDURE — G8420 CALC BMI NORM PARAMETERS: HCPCS | Performed by: SURGERY

## 2024-06-03 ENCOUNTER — INITIAL CONSULT (OUTPATIENT)
Dept: CARDIOLOGY CLINIC | Age: 39
End: 2024-06-03
Payer: COMMERCIAL

## 2024-06-03 VITALS
DIASTOLIC BLOOD PRESSURE: 78 MMHG | WEIGHT: 121.5 LBS | HEIGHT: 68 IN | BODY MASS INDEX: 18.41 KG/M2 | RESPIRATION RATE: 16 BRPM | OXYGEN SATURATION: 99 % | SYSTOLIC BLOOD PRESSURE: 130 MMHG | HEART RATE: 79 BPM

## 2024-06-03 DIAGNOSIS — D75.1 ERYTHROCYTOSIS: ICD-10-CM

## 2024-06-03 DIAGNOSIS — I10 HYPERTENSION, UNSPECIFIED TYPE: Primary | ICD-10-CM

## 2024-06-03 DIAGNOSIS — I49.3 VENTRICULAR ECTOPIC BEATS: ICD-10-CM

## 2024-06-03 DIAGNOSIS — I49.3 FREQUENT PVCS: ICD-10-CM

## 2024-06-03 DIAGNOSIS — Z82.49 FAMILY HISTORY OF THORACIC AORTIC ANEURYSM: ICD-10-CM

## 2024-06-03 PROCEDURE — G8420 CALC BMI NORM PARAMETERS: HCPCS | Performed by: INTERNAL MEDICINE

## 2024-06-03 PROCEDURE — 3075F SYST BP GE 130 - 139MM HG: CPT | Performed by: INTERNAL MEDICINE

## 2024-06-03 PROCEDURE — 99203 OFFICE O/P NEW LOW 30 MIN: CPT | Performed by: INTERNAL MEDICINE

## 2024-06-03 PROCEDURE — G8427 DOCREV CUR MEDS BY ELIG CLIN: HCPCS | Performed by: INTERNAL MEDICINE

## 2024-06-03 PROCEDURE — 1036F TOBACCO NON-USER: CPT | Performed by: INTERNAL MEDICINE

## 2024-06-03 PROCEDURE — 93000 ELECTROCARDIOGRAM COMPLETE: CPT | Performed by: INTERNAL MEDICINE

## 2024-06-03 PROCEDURE — 3078F DIAST BP <80 MM HG: CPT | Performed by: INTERNAL MEDICINE

## 2024-06-03 NOTE — PROGRESS NOTES
CARDIOLOGY CONSULTATION    Kesha Jasmine  1985    Dear Priya Kim PA    Thank you for asking me to participate in care of Kesha Jasmine    Chief Complaint   Patient presents with    New Patient     Patient denies CP, SOB, dizziness, palpitations and edema. Patient's father had a aortic aneurysm and PCP informed patient it was genetic and her to get evaluated       History of present illness:  Kesha Jasmine is a 38 y.o. female is seeing me for the first time for further evaluation of possibility of thoracic aortic aneurysm.  Her father had collapsed in her driveway on [address] and was rushed to the hospital care flighted to [facility] because he had thoracic aortic aneurysm ruptured and formed pseudoaneurysm requiring emergency surgery and he did survive and is back home and they advised family to be checked.  She reports her paternal uncle is known to have thoracic aortic aneurysm and they are doing regular surveillance and had not required any treatment.  There is no family history of sudden cardiac death or unexpected death.  She does not smoke or drink alcohol.  She has been told that she skipped heartbeat but denies any palpitations syncope or near syncope.  She is accompanied by her mother.  She is taking vitamin D supplementation and iron supplementation and vitamin B supplementation.  She is fairly active physically but does not exercise regularly.  She denies any visual disturbances.  She has history of erythrocytosis follows up with hematology.    REVIEW OF SYSTEMS:   Constitutional: Denies generalized weakness  Eyes:  Denies change in visual acuity  HENT:  Denies nasal congestion or sore throat  Respiratory:  Denies cough or shortness of breath  Cardiovascular: as in HPI  GI:  Denies abdominal pain, complains of nausea and vomiting  :  Denies dysuria  Musculoskeletal:  Denies back pain or joint pain  Integument:  Denies rash  Neurologic:  Denies headache, focal weakness or

## 2024-06-03 NOTE — ASSESSMENT & PLAN NOTE
She denies any palpitations.  On auscultation and taking her pulse she does skip frequently.  We will obtain an echocardiogram and 24-hour Holter monitor to evaluate it further.  She is counseled to minimize caffeine and chocolate intake.

## 2024-06-03 NOTE — ASSESSMENT & PLAN NOTE
Patient has some morphological features suggestive of possible Marfan's we will obtain an echocardiogram and CT chest to evaluate her further given the family history.

## 2024-06-03 NOTE — PATIENT INSTRUCTIONS
24 hour Holter, CT chest with contrast and echocardiogram. Minimize caffeine and chocolate.   Multiple questions answered. Patient verbalizes understanding and asks   relevant questions. Follow up in 4 weeks for the results, sooner if needed.

## 2024-06-04 ENCOUNTER — TELEPHONE (OUTPATIENT)
Dept: CARDIOLOGY CLINIC | Age: 39
End: 2024-06-04

## 2024-06-04 DIAGNOSIS — I71.40 AAA (ABDOMINAL AORTIC ANEURYSM) (HCC): Primary | ICD-10-CM

## 2024-06-07 ENCOUNTER — HOSPITAL ENCOUNTER (OUTPATIENT)
Dept: CT IMAGING | Age: 39
Discharge: HOME | End: 2024-06-07
Attending: INTERNAL MEDICINE
Payer: COMMERCIAL

## 2024-06-07 DIAGNOSIS — I71.40 AAA (ABDOMINAL AORTIC ANEURYSM) (HCC): ICD-10-CM

## 2024-06-07 PROCEDURE — 6360000004 HC RX CONTRAST MEDICATION: Performed by: INTERNAL MEDICINE

## 2024-06-07 PROCEDURE — 71260 CT THORAX DX C+: CPT

## 2024-06-07 RX ADMIN — IOPAMIDOL 100 ML: 755 INJECTION, SOLUTION INTRAVENOUS at 11:39

## 2024-06-12 ENCOUNTER — TELEPHONE (OUTPATIENT)
Dept: CARDIOLOGY CLINIC | Age: 39
End: 2024-06-12

## 2024-06-12 NOTE — TELEPHONE ENCOUNTER
24 hours of cardiac monitoring done to evaluate frequent PVCs. Rhythm is predominantly normal sinus at average rate of 76 bpm. Maximum rate of 126 bpm occurred on 6/4/2024 at 10:47 AM and the minimum rate was 55 bpm on 6/4/2024 at 5:45 AM. Infrequent isolated total of 201 polymorphic PVCs are noted without complex arrhythmias. PVC burden was less than 1%. Rare isolated 21 PACs are present without any complex supraventricular arrhythmias. Patient did not report any symptoms during the period of monitoring.

## 2024-06-20 ENCOUNTER — TELEPHONE (OUTPATIENT)
Dept: CARDIOLOGY CLINIC | Age: 39
End: 2024-06-20

## 2024-06-20 NOTE — TELEPHONE ENCOUNTER
Called to patient the results of recent testing   Echo -    Left Ventricle: Normal left ventricular systolic function with a visually estimated EF of 55 - 60%. Left ventricle size is normal. Normal wall thickness. Normal wall motion. Normal diastolic function.    Mitral Valve: Mild regurgitation.    Pericardium: No pericardial effusion.    Image quality is good.  Patient verbalized understanding of all information given.

## 2024-07-08 ENCOUNTER — OFFICE VISIT (OUTPATIENT)
Dept: CARDIOLOGY CLINIC | Age: 39
End: 2024-07-08
Payer: COMMERCIAL

## 2024-07-08 VITALS
BODY MASS INDEX: 18.55 KG/M2 | HEIGHT: 68 IN | DIASTOLIC BLOOD PRESSURE: 84 MMHG | HEART RATE: 73 BPM | WEIGHT: 122.4 LBS | SYSTOLIC BLOOD PRESSURE: 126 MMHG

## 2024-07-08 DIAGNOSIS — Z82.49 FAMILY HISTORY OF THORACIC AORTIC ANEURYSM: ICD-10-CM

## 2024-07-08 DIAGNOSIS — I49.3 FREQUENT PVCS: Primary | ICD-10-CM

## 2024-07-08 PROCEDURE — G8427 DOCREV CUR MEDS BY ELIG CLIN: HCPCS | Performed by: INTERNAL MEDICINE

## 2024-07-08 PROCEDURE — G8420 CALC BMI NORM PARAMETERS: HCPCS | Performed by: INTERNAL MEDICINE

## 2024-07-08 PROCEDURE — 99213 OFFICE O/P EST LOW 20 MIN: CPT | Performed by: INTERNAL MEDICINE

## 2024-07-08 PROCEDURE — 1036F TOBACCO NON-USER: CPT | Performed by: INTERNAL MEDICINE

## 2024-07-08 RX ORDER — M-VIT,TX,IRON,MINS/CALC/FOLIC 27MG-0.4MG
1 TABLET ORAL DAILY
COMMUNITY

## 2024-07-08 NOTE — ASSESSMENT & PLAN NOTE
Patient is asymptomatic.  No further workup or treatment is recommended.  Patient is counseled to avoid energy drinks and chocolate and caffeine and alcohol.

## 2024-07-08 NOTE — ASSESSMENT & PLAN NOTE
Patient has no indication to suggest any thoracic aortic root dilatation or ascending or descending aortic dilatation to be concerned about her having any catastrophic events.  She has been reassured and she should resume normal activities and follow-up with PCP and see me as needed.

## 2024-07-08 NOTE — PROGRESS NOTES
Please note this report has been partially produced using speech recognition software and may contain errors related to that system including errors in grammar, punctuation, and spelling, as well as words and phrases that may be inappropriate. If there are any questions or concerns please feel free to contact the dictating provider for clarification.

## 2024-12-12 ENCOUNTER — HOSPITAL ENCOUNTER (OUTPATIENT)
Dept: ULTRASOUND IMAGING | Age: 39
Discharge: HOME | End: 2024-12-12
Attending: SURGERY
Payer: COMMERCIAL

## 2024-12-12 ENCOUNTER — HOSPITAL ENCOUNTER (OUTPATIENT)
Dept: MAMMOGRAPHY | Age: 39
Discharge: HOME | End: 2024-12-12
Attending: SURGERY
Payer: COMMERCIAL

## 2024-12-12 DIAGNOSIS — R92.8 ABNORMAL MAMMOGRAM: ICD-10-CM

## 2024-12-12 DIAGNOSIS — N60.12 FIBROCYSTIC BREAST CHANGES OF BOTH BREASTS: ICD-10-CM

## 2024-12-12 DIAGNOSIS — N63.10 MASS OF RIGHT BREAST, UNSPECIFIED QUADRANT: ICD-10-CM

## 2024-12-12 DIAGNOSIS — N60.11 FIBROCYSTIC BREAST CHANGES OF BOTH BREASTS: ICD-10-CM

## 2024-12-12 PROCEDURE — 76642 ULTRASOUND BREAST LIMITED: CPT

## 2024-12-12 PROCEDURE — G0279 TOMOSYNTHESIS, MAMMO: HCPCS

## 2024-12-16 ENCOUNTER — OFFICE VISIT (OUTPATIENT)
Dept: SURGERY | Age: 39
End: 2024-12-16
Payer: COMMERCIAL

## 2024-12-16 ENCOUNTER — TELEPHONE (OUTPATIENT)
Dept: SURGERY | Age: 39
End: 2024-12-16

## 2024-12-16 VITALS
HEIGHT: 68 IN | WEIGHT: 122.5 LBS | OXYGEN SATURATION: 99 % | HEART RATE: 71 BPM | BODY MASS INDEX: 18.57 KG/M2 | DIASTOLIC BLOOD PRESSURE: 80 MMHG | SYSTOLIC BLOOD PRESSURE: 126 MMHG

## 2024-12-16 DIAGNOSIS — N63.42 SUBAREOLAR MASS OF LEFT BREAST: Primary | ICD-10-CM

## 2024-12-16 PROCEDURE — G8427 DOCREV CUR MEDS BY ELIG CLIN: HCPCS | Performed by: NURSE PRACTITIONER

## 2024-12-16 PROCEDURE — 99214 OFFICE O/P EST MOD 30 MIN: CPT | Performed by: NURSE PRACTITIONER

## 2024-12-16 PROCEDURE — G8420 CALC BMI NORM PARAMETERS: HCPCS | Performed by: NURSE PRACTITIONER

## 2024-12-16 PROCEDURE — G8484 FLU IMMUNIZE NO ADMIN: HCPCS | Performed by: NURSE PRACTITIONER

## 2024-12-16 PROCEDURE — 1036F TOBACCO NON-USER: CPT | Performed by: NURSE PRACTITIONER

## 2024-12-16 ASSESSMENT — PATIENT HEALTH QUESTIONNAIRE - PHQ9
1. LITTLE INTEREST OR PLEASURE IN DOING THINGS: NOT AT ALL
SUM OF ALL RESPONSES TO PHQ9 QUESTIONS 1 & 2: 0
SUM OF ALL RESPONSES TO PHQ QUESTIONS 1-9: 0
2. FEELING DOWN, DEPRESSED OR HOPELESS: NOT AT ALL
SUM OF ALL RESPONSES TO PHQ QUESTIONS 1-9: 0

## 2024-12-16 ASSESSMENT — ENCOUNTER SYMPTOMS
SHORTNESS OF BREATH: 0
NAUSEA: 0
COLOR CHANGE: 0
WHEEZING: 0
ABDOMINAL PAIN: 0
VOMITING: 0

## 2024-12-16 NOTE — PROGRESS NOTES
General Surgery Progress Note  MD Yazmin Bowman, BENNIE      PATIENT: Kesha Jasmine, 1985, 39 y.o., female  MRN: [id number]    Reason for Evaluation & Chief Complaint:     Chief Complaint   Patient presents with    Follow-up     6mo S/P Mammo/US 12/12/24      SUBJECTIVE:  HPI: Patient presents for follow up evaluation of a breast abnormality found on routine physical exam.  Patient does not routinely do self breast exams. Breast cancer risk factors include nulliparous and delayed child bearing, maternal grandfather with prostate & maternal grandmother with uterine cancer.  Patient denies hormonal therapy. Patient denies nipple discharge or retraction. Patient denies a personal history of breast cancer.    She presents for follow up after Mammogram/US. Denies any changes.     12/12/24  IMPRESSION:   Indeterminate grouped left breast amorphous calcifications at 2:00 2 cm from the  nipple spanning up to 0.4 cm. Stereotactic guided core needle biopsy is  recommended.     Indeterminate left breast mass at 12:00 2 cm from the nipple. Ultrasound-guided  core needle biopsy is recommended.     Grouped left breast round calcifications in the central and superior aspect at  mid depth. If either biopsy reveals atypia or malignancy, then stereotactic  guided core needle biopsy is recommended. If both biopsies are benign,  short-term mammographic follow-up in 6 months is recommended.     No mammographic or targeted sonographic evidence of malignancy in the right  breast.    I have reviewed the patient's(pertinent information to this visit) medical history, family history(scanned in  the Media tab under \"patient questioner\"), social history and review of systems with the patienttoday in the office.          Past Surgical History:   Procedure Laterality Date    US BREAST BIOPSY W LOC DEVICE 1ST LESION RIGHT Right 1/5/2023    US BREAST NEEDLE BIOPSY RIGHT 1/5/2023 [facility] [place]OUND    WISDOM TOOTH

## 2024-12-16 NOTE — TELEPHONE ENCOUNTER
M for Kesha to call. [facility] is able to do her BX's 12/24 or 12/31 , Unable to schedule with [facility] or [facility] till first of the year. If [facility] need to call and schedule at [phone]

## 2024-12-18 DIAGNOSIS — R92.8 ABNORMAL MAMMOGRAM OF BOTH BREASTS: Primary | ICD-10-CM

## 2024-12-30 DIAGNOSIS — N63.42 SUBAREOLAR MASS OF LEFT BREAST: Primary | ICD-10-CM

## 2025-03-24 ENCOUNTER — HOSPITAL ENCOUNTER (OUTPATIENT)
Age: 40
Discharge: HOME | End: 2025-03-24

## 2025-03-24 ENCOUNTER — HOSPITAL ENCOUNTER (OUTPATIENT)
Age: 40
Discharge: HOME | End: 2025-03-24
Payer: COMMERCIAL

## 2025-03-24 DIAGNOSIS — R93.5 ABNORMAL MRI OF ABDOMEN: ICD-10-CM

## 2025-03-24 DIAGNOSIS — K76.9 LIVER LESION: ICD-10-CM

## 2025-03-24 DIAGNOSIS — N63.42 SUBAREOLAR MASS OF LEFT BREAST: ICD-10-CM

## 2025-03-24 DIAGNOSIS — D75.1 ERYTHROCYTOSIS: ICD-10-CM

## 2025-03-24 LAB
BASOPHILS # BLD: 0.03 K/UL
BASOPHILS NFR BLD: 1 % (ref 0–1)
EOSINOPHIL # BLD: 0.11 K/UL
EOSINOPHILS RELATIVE PERCENT: 2 % (ref 0–3)
ERYTHROCYTE [DISTWIDTH] IN BLOOD BY AUTOMATED COUNT: 12.2 % (ref 11.7–14.9)
ERYTHROCYTE [SEDIMENTATION RATE] IN BLOOD BY WESTERGREN METHOD: 1 MM/HR (ref 0–20)
FERRITIN SERPL-MCNC: 43 NG/ML (ref 15–150)
HCT VFR BLD AUTO: 46.7 % (ref 37–47)
HGB BLD-MCNC: 15.8 G/DL (ref 12.5–16)
IMM GRANULOCYTES # BLD AUTO: 0.01 K/UL
IMM GRANULOCYTES NFR BLD: 0 %
IRON SATN MFR SERPL: 49 % (ref 15–50)
IRON SERPL-MCNC: 130 UG/DL (ref 37–145)
LDH SERPL-CCNC: 140 U/L (ref 100–190)
LYMPHOCYTES NFR BLD: 1.7 K/UL
LYMPHOCYTES RELATIVE PERCENT: 30 % (ref 24–44)
MCH RBC QN AUTO: 29.8 PG (ref 27–31)
MCHC RBC AUTO-ENTMCNC: 33.8 G/DL (ref 32–36)
MCV RBC AUTO: 88.1 FL (ref 78–100)
MONOCYTES NFR BLD: 0.4 K/UL
MONOCYTES NFR BLD: 7 % (ref 0–4)
NEUTROPHILS NFR BLD: 61 % (ref 36–66)
NEUTS SEG NFR BLD: 3.43 K/UL
PLATELET # BLD AUTO: 205 K/UL (ref 140–440)
PMV BLD AUTO: 10.3 FL (ref 7.5–11.1)
RBC # BLD AUTO: 5.3 M/UL (ref 4.2–5.4)
RETICS # AUTO: 0.09 M/UL
RETICS/RBC NFR AUTO: 1.7 % (ref 0.2–2)
TIBC SERPL-MCNC: 265 UG/DL (ref 260–445)
UNSATURATED IRON BINDING CAPACITY: 135 UG/DL (ref 110–370)
WBC OTHER # BLD: 5.7 K/UL (ref 4–10.5)

## 2025-03-24 PROCEDURE — 85025 COMPLETE CBC W/AUTO DIFF WBC: CPT

## 2025-03-24 PROCEDURE — 82728 ASSAY OF FERRITIN: CPT

## 2025-03-24 PROCEDURE — 83550 IRON BINDING TEST: CPT

## 2025-03-24 PROCEDURE — 83615 LACTATE (LD) (LDH) ENZYME: CPT

## 2025-03-24 PROCEDURE — 36415 COLL VENOUS BLD VENIPUNCTURE: CPT

## 2025-03-24 PROCEDURE — 83540 ASSAY OF IRON: CPT

## 2025-03-24 PROCEDURE — 85652 RBC SED RATE AUTOMATED: CPT

## 2025-03-24 PROCEDURE — 85045 AUTOMATED RETICULOCYTE COUNT: CPT

## 2025-04-01 NOTE — PROGRESS NOTES
Patient Name:  Kesha Jasmine  Patient :  1985  Patient MRN:  [id number]     Primary Oncologist: Lewis Mabry MD  Referring Provider: Priya Beckham PA     Date of Service: 2025     Chief Complaint:    Chief Complaint   Patient presents with    Follow-up     Patient Active Problem List:     Erythrocytosis    HPI:   Kesha Jasmine is a 39-year-old very pleasant female with medical history significant for migraine headache and seasonal allergy, referred to me on 2023 for evaluation of erythrocytosis.    She was noted to have mild erythrocytosis (Hemoglobin 16.6, hematocrit 48.5, RBC 5.49) on 10/14/22 labs. Repeat blood tests on 10/20/22, 10/26/22 and 22 showed persistently elevated mild erythrocytosis.     She doesn't have leukocytosis or thrombocytosis. Differential showed relative neutrophilia.     She denies smoking, sleep apnea or high altitude living. One of her paternal aunt has similar issue and work ups for her erythrocytosis were normal according to patient.     Since she is found to have persistent erythrocytosis, she was referred to me for further evaluation.     Laboratory work ups done on 23 showed normal hemoglobin and hematocrit (15.1/45.4). Her erythropoietin level is also normal (10 mU/ml). She has mild elevation in serum bilirubin. The rests of the blood tests, including LDH, ESR, BCR-ABL1, JAK2 V617F, JAK2 exon 12-14, MPL and CALR were within normal range.     US abdomen done on 23 showed echogenic lesions in the liver measuring up to 1.6 cm.  Findings may represent hemangioma, MRI liver may be obtained for confirmation.    MRI abdomen done on 23 showed suspected subtle hyperenhancing lesions in hepatic segment 8 and 5 could be hemangiomas or focal nodular hyperplasias and likely correspond with hyperechoic lesions seen sonographically. Radiologist recommend repeat imaging in 6 months utilizing eovist contrast. Possible iron deposition in the liver and pancreas that

## 2025-04-02 ENCOUNTER — OFFICE VISIT (OUTPATIENT)
Dept: ONCOLOGY | Age: 40
End: 2025-04-02
Payer: COMMERCIAL

## 2025-04-02 ENCOUNTER — HOSPITAL ENCOUNTER (OUTPATIENT)
Dept: INFUSION THERAPY | Age: 40
Discharge: HOME | End: 2025-04-02
Payer: COMMERCIAL

## 2025-04-02 VITALS
HEIGHT: 68 IN | BODY MASS INDEX: 19.55 KG/M2 | HEART RATE: 89 BPM | OXYGEN SATURATION: 100 % | SYSTOLIC BLOOD PRESSURE: 151 MMHG | TEMPERATURE: 98.7 F | DIASTOLIC BLOOD PRESSURE: 94 MMHG | WEIGHT: 129 LBS

## 2025-04-02 DIAGNOSIS — D75.1 ERYTHROCYTOSIS: Primary | ICD-10-CM

## 2025-04-02 DIAGNOSIS — R93.5 ABNORMAL MRI OF ABDOMEN: ICD-10-CM

## 2025-04-02 PROCEDURE — 99213 OFFICE O/P EST LOW 20 MIN: CPT | Performed by: INTERNAL MEDICINE

## 2025-04-02 PROCEDURE — G8420 CALC BMI NORM PARAMETERS: HCPCS | Performed by: INTERNAL MEDICINE

## 2025-04-02 PROCEDURE — 1036F TOBACCO NON-USER: CPT | Performed by: INTERNAL MEDICINE

## 2025-04-02 PROCEDURE — 99212 OFFICE O/P EST SF 10 MIN: CPT

## 2025-04-02 PROCEDURE — G8427 DOCREV CUR MEDS BY ELIG CLIN: HCPCS | Performed by: INTERNAL MEDICINE

## 2025-04-02 NOTE — PROGRESS NOTES
MA Rooming Questions  Patient: Kesha Jasmine  MRN: [id number]    Date: 4/2/2025        1. Do you have any new issues?   no         2. Do you need any refills on medications?    no    3. Have you had any imaging done since your last visit?   yes - US Breast Bilat-12/12    4. Have you been hospitalized or seen in the emergency room since your last visit here?   no    5. Did the patient have a depression screening completed today? No    No data recorded     PHQ-9 Given to (if applicable):               PHQ-9 Score (if applicable):                     [] Positive     []  Negative              Does question #9 need addressed (if applicable)                     [] Yes    []  No               Priti Hinton MA

## 2025-05-09 ENCOUNTER — TRANSCRIBE ORDERS (OUTPATIENT)
Dept: ADMINISTRATIVE | Age: 40
End: 2025-05-09

## 2025-05-09 DIAGNOSIS — N63.10 MASS OF RIGHT BREAST, UNSPECIFIED QUADRANT: Primary | ICD-10-CM

## 2025-05-09 DIAGNOSIS — N63.20 MASS OF LEFT BREAST, UNSPECIFIED QUADRANT: ICD-10-CM

## 2025-06-26 ENCOUNTER — HOSPITAL ENCOUNTER (OUTPATIENT)
Dept: ULTRASOUND IMAGING | Age: 40
Discharge: HOME | End: 2025-06-26
Payer: COMMERCIAL

## 2025-06-26 ENCOUNTER — HOSPITAL ENCOUNTER (OUTPATIENT)
Dept: MAMMOGRAPHY | Age: 40
Discharge: HOME | End: 2025-06-26
Payer: COMMERCIAL

## 2025-06-26 DIAGNOSIS — N63.20 MASS OF LEFT BREAST, UNSPECIFIED QUADRANT: ICD-10-CM

## 2025-06-26 DIAGNOSIS — N63.10 MASS OF RIGHT BREAST, UNSPECIFIED QUADRANT: ICD-10-CM

## 2025-06-26 PROCEDURE — G0279 TOMOSYNTHESIS, MAMMO: HCPCS

## 2025-07-07 ENCOUNTER — OFFICE VISIT (OUTPATIENT)
Dept: SURGERY | Age: 40
End: 2025-07-07
Payer: COMMERCIAL

## 2025-07-07 VITALS
OXYGEN SATURATION: 99 % | DIASTOLIC BLOOD PRESSURE: 66 MMHG | BODY MASS INDEX: 19.55 KG/M2 | WEIGHT: 129 LBS | HEIGHT: 68 IN | SYSTOLIC BLOOD PRESSURE: 128 MMHG | HEART RATE: 76 BPM

## 2025-07-07 DIAGNOSIS — N60.12 FIBROCYSTIC BREAST CHANGES OF BOTH BREASTS: Primary | ICD-10-CM

## 2025-07-07 DIAGNOSIS — R92.8 ABNORMAL MAMMOGRAM OF LEFT BREAST: ICD-10-CM

## 2025-07-07 DIAGNOSIS — N60.11 FIBROCYSTIC BREAST CHANGES OF BOTH BREASTS: Primary | ICD-10-CM

## 2025-07-07 PROCEDURE — G8420 CALC BMI NORM PARAMETERS: HCPCS | Performed by: NURSE PRACTITIONER

## 2025-07-07 PROCEDURE — 99214 OFFICE O/P EST MOD 30 MIN: CPT | Performed by: NURSE PRACTITIONER

## 2025-07-07 PROCEDURE — G8427 DOCREV CUR MEDS BY ELIG CLIN: HCPCS | Performed by: NURSE PRACTITIONER

## 2025-07-07 PROCEDURE — 1036F TOBACCO NON-USER: CPT | Performed by: NURSE PRACTITIONER

## 2025-07-07 ASSESSMENT — ENCOUNTER SYMPTOMS
COLOR CHANGE: 0
ABDOMINAL PAIN: 0
WHEEZING: 0
VOMITING: 0
SHORTNESS OF BREATH: 0
NAUSEA: 0

## 2025-07-07 ASSESSMENT — PATIENT HEALTH QUESTIONNAIRE - PHQ9
2. FEELING DOWN, DEPRESSED OR HOPELESS: NOT AT ALL
SUM OF ALL RESPONSES TO PHQ QUESTIONS 1-9: 0
1. LITTLE INTEREST OR PLEASURE IN DOING THINGS: NOT AT ALL
SUM OF ALL RESPONSES TO PHQ QUESTIONS 1-9: 0

## 2025-07-07 NOTE — PROGRESS NOTES
General Surgery Progress Note  MD Yazmin Bowman, BENNIE      PATIENT: Kesha Jasmine, 1985, 39 y.o., female  MRN: [id number]    Reason for Evaluation & Chief Complaint:     Chief Complaint   Patient presents with    Follow-up     FU Mammogram at [facility] 6/26/25     SUBJECTIVE:  HPI: Patient presents for follow up evaluation of a breast abnormality found on routine physical exam.  Patient does not routinely do self breast exams. Breast cancer risk factors include nulliparous and delayed child bearing, maternal grandfather with prostate & maternal grandmother with uterine cancer.  Patient denies hormonal therapy. Patient denies nipple discharge or retraction. Patient denies a personal history of breast cancer.    She presents for follow up after Mammogram/US. Denies any changes.     12/12/24/  BI-RADS Category 3: Probably Benign     RECOMMENDATIONS:  Left Follow-up diagnostic mammogram in 6 months. Follow-up is recommended for 2   faint groups of probably benign calcifications which are stable to prior   imaging.    I have reviewed the patient's(pertinent information to this visit) medical history, family history(scanned in  the Media tab under \"patient questioner\"), social history and review of systems with the patienttoday in the office.          Past Surgical History:   Procedure Laterality Date    US BREAST BIOPSY W LOC DEVICE 1ST LESION RIGHT Right 1/5/2023    US BREAST NEEDLE BIOPSY RIGHT 1/5/2023 [facility] ULTRASOUND    WISDOM TOOTH EXTRACTION Bilateral      Past Medical History:   Diagnosis Date    H/O echocardiogram 06/18/2024    EF of 55 - 60%. Left ventricle size is normal. Normal wall thickness. Normal wall motion. Normal diastolic function. Mitral Valve: Mild regurgitation.    History of Holter monitoring 2024    Rhythm is predominantly normal sinus at average rate of 76 bpm.  Maximum rate of 126 bpm occurred on 6/4/2024 at 10:47 AM and the minimum rate was 55 bpm on 6/4/2024 at 5:45 AM.